# Patient Record
Sex: FEMALE | Race: BLACK OR AFRICAN AMERICAN | NOT HISPANIC OR LATINO
[De-identification: names, ages, dates, MRNs, and addresses within clinical notes are randomized per-mention and may not be internally consistent; named-entity substitution may affect disease eponyms.]

---

## 2021-03-30 ENCOUNTER — APPOINTMENT (OUTPATIENT)
Dept: OTOLARYNGOLOGY | Facility: CLINIC | Age: 76
End: 2021-03-30
Payer: MEDICARE

## 2021-03-30 VITALS
HEIGHT: 68 IN | HEART RATE: 84 BPM | TEMPERATURE: 97.7 F | DIASTOLIC BLOOD PRESSURE: 91 MMHG | WEIGHT: 176 LBS | BODY MASS INDEX: 26.67 KG/M2 | SYSTOLIC BLOOD PRESSURE: 139 MMHG

## 2021-03-30 PROBLEM — Z00.00 ENCOUNTER FOR PREVENTIVE HEALTH EXAMINATION: Status: ACTIVE | Noted: 2021-03-30

## 2021-03-30 PROCEDURE — 31231 NASAL ENDOSCOPY DX: CPT

## 2021-03-30 PROCEDURE — 99204 OFFICE O/P NEW MOD 45 MIN: CPT | Mod: 25

## 2021-03-30 RX ORDER — AMOXICILLIN 500 MG/1
500 TABLET, FILM COATED ORAL
Qty: 14 | Refills: 0 | Status: ACTIVE | COMMUNITY
Start: 2021-03-30 | End: 1900-01-01

## 2021-03-30 RX ORDER — METHYLPREDNISOLONE 4 MG/1
4 TABLET ORAL
Qty: 1 | Refills: 0 | Status: ACTIVE | COMMUNITY
Start: 2021-03-30 | End: 1900-01-01

## 2021-03-30 NOTE — HISTORY OF PRESENT ILLNESS
[de-identified] : Patient complaining of headaches mostly in her frontal area is gone on and off had Covid loss of sense of taste and smell most of it is back but she feels congested runny nose rhinitis seems to be consistent with some underlying allergies she was last treated for sinus infections in fall with an antibiotic nothing since she does not like using any nasal sprays endoscopically she has a severely deviated septum but no evidence of any tumors masses or polyps.

## 2021-03-30 NOTE — REVIEW OF SYSTEMS
[Sneezing] : sneezing [Seasonal Allergies] : seasonal allergies [Post Nasal Drip] : post nasal drip [Nasal Congestion] : nasal congestion [Negative] : Heme/Lymph

## 2021-03-30 NOTE — ASSESSMENT
[FreeTextEntry1] : Patient with symptoms consistent with chronic sinusitis has decreased sense of smell and taste from Covid but is almost back also suffers from allergy symptoms after long discussion and evaluation endoscopically that revealed no evidence of any pus or purulence but did confirm a deviated nasal septum feel she is suffering from underlying chronic sinusitis complicated by allergies.  She will continue her of Allegra I gave her a Medrol Dosepak and a course of amoxicillin and will have her come back and see us in 1 month if the symptoms do not improve by then consideration for a CAT scan will be given.

## 2021-04-27 ENCOUNTER — APPOINTMENT (OUTPATIENT)
Dept: OTOLARYNGOLOGY | Facility: CLINIC | Age: 76
End: 2021-04-27
Payer: MEDICARE

## 2021-04-27 VITALS
SYSTOLIC BLOOD PRESSURE: 132 MMHG | TEMPERATURE: 97.2 F | DIASTOLIC BLOOD PRESSURE: 86 MMHG | WEIGHT: 176 LBS | BODY MASS INDEX: 26.67 KG/M2 | HEIGHT: 68 IN | HEART RATE: 80 BPM

## 2021-04-27 DIAGNOSIS — J30.9 ALLERGIC RHINITIS, UNSPECIFIED: ICD-10-CM

## 2021-04-27 DIAGNOSIS — R09.81 NASAL CONGESTION: ICD-10-CM

## 2021-04-27 PROCEDURE — 31231 NASAL ENDOSCOPY DX: CPT

## 2021-04-27 PROCEDURE — 99214 OFFICE O/P EST MOD 30 MIN: CPT | Mod: 25

## 2021-04-27 RX ORDER — AMOXICILLIN AND CLAVULANATE POTASSIUM 875; 125 MG/1; MG/1
875-125 TABLET, COATED ORAL
Qty: 20 | Refills: 0 | Status: ACTIVE | COMMUNITY
Start: 2021-04-27 | End: 1900-01-01

## 2021-04-27 NOTE — ASSESSMENT
[FreeTextEntry1] : Symptoms improved on steroids and a Z-Laurent but then recurred also seems to have underlying allergy symptoms which Allegra is helping unfortunately she has been taking a lot of sinus medications decongestants which I told her to stop because of her high blood pressure endoscopically no pus or purulence but she still complains of frontal and facial cheek pressure put her on Augmentin and sent her for a CAT scan to definitively evaluate her sinuses based on the results we will determine which direction we go either additional antibiotics or consideration of surgery this was explained to her and all of her questions were answered.\par \par I personally saw and examined [ ] in detail. I have spoken to Clovis JACINTO regarding the assessment and plan of care. I reviewed the above assessment and plan of care, and agree. I have made changes in the body of the note where appropriate.\par

## 2021-04-27 NOTE — PROCEDURE
[FreeTextEntry6] : Nasal Endoscopy (17359)\par \par After informed verbal consent, nasal endoscopy was performed due to anterior rhinoscopy insufficient to account for symptoms. Flexible  scope # 23 was used.  Topical vasoconstrictor and anesthetic was used.  The exam showed a deviated septum to the right \par \par The nasal endoscope is passed via the right nasal cavity. The inferior, middle, and superior turbinates responded to vasoconstrictors. The inferior, middle and superior meati were examined. The osteomeatal complex is clear with no polyposis or purulence. The sphenoethmoidal recess is clear with no polyposis or purulence. The choana is patent. \par \par The nasal endoscope is passed via the left nasal cavity. The inferior, middle, and superior turbinates responded to vasoconstrictors. The inferior, middle and superior meati were examined. The osteomeatal complex is clear with no polyposis or purulence. The sphenoethmoidal recess is clear with no polyposis or purulence. The choana is patent.  \par \par There is []% obstruction of the nasopharynx with adenoid tissue\par

## 2021-04-27 NOTE — HISTORY OF PRESENT ILLNESS
[de-identified] : Patient presents for follow up. States she continues to feel the same. Still has frontal headaches, nasal congestion and runny nose. She took Medrol dose pack and amoxicillin gave her mild relief and then symptoms returned. Takes Allegra and Tylenol with mild relief. No other modifying factors\par \par

## 2022-02-25 ENCOUNTER — EMERGENCY (EMERGENCY)
Facility: HOSPITAL | Age: 77
LOS: 1 days | Discharge: HOME | End: 2022-02-25
Attending: EMERGENCY MEDICINE | Admitting: EMERGENCY MEDICINE
Payer: MEDICARE

## 2022-02-25 VITALS
DIASTOLIC BLOOD PRESSURE: 79 MMHG | HEART RATE: 82 BPM | RESPIRATION RATE: 16 BRPM | TEMPERATURE: 99 F | SYSTOLIC BLOOD PRESSURE: 147 MMHG | OXYGEN SATURATION: 100 %

## 2022-02-25 VITALS
RESPIRATION RATE: 16 BRPM | DIASTOLIC BLOOD PRESSURE: 74 MMHG | HEART RATE: 78 BPM | SYSTOLIC BLOOD PRESSURE: 140 MMHG | OXYGEN SATURATION: 100 %

## 2022-02-25 PROCEDURE — 99284 EMERGENCY DEPT VISIT MOD MDM: CPT

## 2022-02-25 PROCEDURE — 70486 CT MAXILLOFACIAL W/O DYE: CPT | Mod: 26,MG

## 2022-02-25 PROCEDURE — 73100 X-RAY EXAM OF WRIST: CPT | Mod: 26,59,RT

## 2022-02-25 PROCEDURE — 73502 X-RAY EXAM HIP UNI 2-3 VIEWS: CPT | Mod: 26,RT

## 2022-02-25 PROCEDURE — 70450 CT HEAD/BRAIN W/O DYE: CPT | Mod: 26,MG

## 2022-02-25 PROCEDURE — 73110 X-RAY EXAM OF WRIST: CPT | Mod: 26,RT

## 2022-02-25 PROCEDURE — 73562 X-RAY EXAM OF KNEE 3: CPT | Mod: 26,LT

## 2022-02-25 PROCEDURE — 73090 X-RAY EXAM OF FOREARM: CPT | Mod: 26,RT

## 2022-02-25 PROCEDURE — 72170 X-RAY EXAM OF PELVIS: CPT | Mod: 26,59

## 2022-02-25 PROCEDURE — G1004: CPT

## 2022-02-25 RX ORDER — AMOXICILLIN 250 MG/5ML
500 SUSPENSION, RECONSTITUTED, ORAL (ML) ORAL ONCE
Refills: 0 | Status: COMPLETED | OUTPATIENT
Start: 2022-02-25 | End: 2022-02-25

## 2022-02-25 RX ORDER — OXYCODONE AND ACETAMINOPHEN 5; 325 MG/1; MG/1
1 TABLET ORAL ONCE
Refills: 0 | Status: DISCONTINUED | OUTPATIENT
Start: 2022-02-25 | End: 2022-02-25

## 2022-02-25 RX ORDER — AMOXICILLIN 250 MG/5ML
1 SUSPENSION, RECONSTITUTED, ORAL (ML) ORAL
Qty: 21 | Refills: 0
Start: 2022-02-25 | End: 2022-03-03

## 2022-02-25 RX ORDER — OXYCODONE HYDROCHLORIDE 5 MG/1
1 TABLET ORAL
Qty: 12 | Refills: 0
Start: 2022-02-25 | End: 2022-02-27

## 2022-02-25 RX ORDER — TETANUS TOXOID, REDUCED DIPHTHERIA TOXOID AND ACELLULAR PERTUSSIS VACCINE, ADSORBED 5; 2.5; 8; 8; 2.5 [IU]/.5ML; [IU]/.5ML; UG/.5ML; UG/.5ML; UG/.5ML
0.5 SUSPENSION INTRAMUSCULAR ONCE
Refills: 0 | Status: COMPLETED | OUTPATIENT
Start: 2022-02-25 | End: 2022-02-25

## 2022-02-25 RX ORDER — CHLORHEXIDINE GLUCONATE 213 G/1000ML
15 SOLUTION TOPICAL
Qty: 473 | Refills: 0
Start: 2022-02-25

## 2022-02-25 RX ADMIN — TETANUS TOXOID, REDUCED DIPHTHERIA TOXOID AND ACELLULAR PERTUSSIS VACCINE, ADSORBED 0.5 MILLILITER(S): 5; 2.5; 8; 8; 2.5 SUSPENSION INTRAMUSCULAR at 17:03

## 2022-02-25 RX ADMIN — OXYCODONE AND ACETAMINOPHEN 1 TABLET(S): 5; 325 TABLET ORAL at 17:03

## 2022-02-25 RX ADMIN — Medication 500 MILLIGRAM(S): at 22:44

## 2022-02-25 RX ADMIN — OXYCODONE AND ACETAMINOPHEN 1 TABLET(S): 5; 325 TABLET ORAL at 18:03

## 2022-02-25 NOTE — ED ADULT NURSE REASSESSMENT NOTE - NS ED NURSE REASSESS COMMENT FT1
Received report from Day RN: Pt is alert and awake, No NAD or complaints, OMFS in room with patient for sutures. Respirations are even and unlabored, safety precautions implemented as per protocol, will continue to monitor.

## 2022-02-25 NOTE — ED PROVIDER NOTE - ATTENDING CONTRIBUTION TO CARE
Pt had mechanical fall p/w facial laceration to upper lip that is through and through, small laceration to L supraorbital area, L knee pain and R wrist pain. Denies preceding headache, dizziness, cp, sob, palpitations. R Wrist is swollen and painful, able to range it minimally, neurovasc intact throughout R hand, L knee with ecchymosis but full ROM, able to ambulate w/o pain. Head and face CT w/o fracture or ICH, OMFS consulted for complex lac repair of upper lip.

## 2022-02-25 NOTE — ED PROVIDER NOTE - PHYSICAL EXAMINATION
Gen: AAOx3, non-toxic  Head: NCAT  HEENT: EOMI, oral mucosa moist, normal conjunctiva. 1.5cm laceration superior to R eyebrow, 1.5cm lac superior to upper lip not involving vermilion border.   Lung: CTAB, no respiratory distress, no wheezes/rhonchi/rales B/L, speaking in full sentences  CV: RRR, no murmurs, rubs or gallops  Abd: soft, NTND, no guarding, no CVA tenderness  MSK: R wrist ttp; neurovascularly intact. L knee ttp. No c-spine ttp.   Neuro: No focal sensory or motor deficits, normal CN exam   Skin: see above  Psych: normal affect.

## 2022-02-25 NOTE — ED PROVIDER NOTE - OBJECTIVE STATEMENT
76 F with htn, hld not on AC presents s/p trip and fall today with facial lacerations, R wrist pain and left knee pain. Fell 2 hrs prior to arrival with no chest pain, sob, headache, nausea or vision changes. Able to ambulate afterwards. Patient is completely independent. Unknown last Tdap.

## 2022-02-25 NOTE — ED PROVIDER NOTE - PATIENT PORTAL LINK FT
You can access the FollowMyHealth Patient Portal offered by [facility] by registering at the following website: [email or web address]. By joining [facility]’s FollowMyHealth portal, you will also be able to view your health information using other applications (apps) compatible with our system.

## 2022-02-25 NOTE — CONSULT NOTE ADULT - ASSESSMENT
Assessment/Plan: 77 y/o F w PMH of HTN, HLD, hypothyroidism presents to [facility] ED s/p mechanical fall with laceration of right brow and right through and through labial mucosa laceration now s/p repair    - Amox 500 q8h x7 days  - Peridex 15mL swish and spit BID x7d   - Soft, mild diet   - Cover lacerations w bacitracin  - ice to face as needed   - F/U in 1 week in the oral surgery clinic (1st floor [facility]) with Dr Faheem Duckworth. Please call [phone] to schedule appt.  - Please page OMFS resident on call  with questions.

## 2022-02-25 NOTE — ED PROVIDER NOTE - PROGRESS NOTE DETAILS
Laceration repaired by OMFS; will f/u in 1 week. Laceration repaired by OMFS; will f/u in 1 week.  Fracture reduced by ortho; will f/u in 1wk

## 2022-02-25 NOTE — ED ADULT TRIAGE NOTE - CHIEF COMPLAINT QUOTE
Pt states that she tripped and fell in the street, c/o laceration to upper lip and above right eyebrow, right wrist pain.  PMH HTN

## 2022-02-25 NOTE — ED PROVIDER NOTE - CLINICAL SUMMARY MEDICAL DECISION MAKING FREE TEXT BOX
76 F with htn, hld not on AC presents s/p trip and fall today with facial lacerations, R wrist pain and left knee pain. 1.5cm laceration superior to R eyebrow, 1.5cm lac superior to upper lip not involving vermilion border. Fall appears purely mechanical with no signs of cardiac etiology.  R wrist ttp; neurovascularly intact. L knee ttp; Neurovascularly intact. No c-spine ttp. CT head and face, XR, update Tdap, lac repair, reassess.

## 2022-02-25 NOTE — ED PROVIDER NOTE - CARE PROVIDER_API CALL
Wilbur Montalvo)  Orthopedics  [address] [place] [address]  Phone: [phone]  Fax: [phone]  Follow Up Time:

## 2022-02-25 NOTE — ED PROVIDER NOTE - NSFOLLOWUPINSTRUCTIONS_ED_ALL_ED_FT
You were seen in the ED for a fall, found to have a wrist fracture. Also with facial lacerations.   The following labs/imaging were obtained: see attached (if applicable)  Continue home medications (if any).   Take Acetaminophen (Tylenol 1,000mg each 6-8hrs)  Return to the ED if you develop fever,  vomiting, worsening or new concerning symptoms.  Follow up OMFS (oral-maxilar facial surgery),   Discussed with pt results of work up, strict return precautions, and need for follow up.  Pt expressed understanding and agrees with plan. You were seen in the ED for a fall, found to have a wrist fracture. Also with facial lacerations.   The following labs/imaging were obtained: see attached (if applicable)  Continue home medications (if any).   Take Acetaminophen (Tylenol 1,000mg each 6-8hrs)  Return to the ED if you develop fever,  vomiting, worsening or new concerning symptoms.  Follow up OMFS Dr. Jenise Araujo (oral-maxila facial surgery) in 1 week for suture removal. Call [phone]  Follow up with orthopedics in 1-2 weeks. See attached.   Discussed with pt results of work up, strict return precautions, and need for follow up.  Pt expressed understanding and agrees with plan. You were seen in the ED for a fall, found to have a wrist fracture. Also with facial lacerations.   The following labs/imaging were obtained: see attached (if applicable)  Continue home medications (if any).   Take Acetaminophen (Tylenol 1,000mg each 6-8hrs)  Take antibiotic as prescribed (amoxicillin).   Return to the ED if you develop fever,  vomiting, worsening or new concerning symptoms.  Follow up OMFS Dr. Jenise Araujo (oral-maxila facial surgery) in 1 week for suture removal. Call [phone]  Follow up with orthopedics in 1-2 weeks. See attached.   Discussed with pt results of work up, strict return precautions, and need for follow up.  Pt expressed understanding and agrees with plan.

## 2022-02-25 NOTE — ED ADULT NURSE NOTE - OBJECTIVE STATEMENT
Break Coverage RN: Received pt in room 5, pt A&Ox4, respirations even and unlabored b/l. Pt s/p trip and fall outside on street. Denies LOC, denies n/v, blurry vision. Pt with laceration above lip and R. eyebrow. Also c/o R. wrist pain. Appears in no apparent distress. Medicated as per MD order. Will continue to monitor.

## 2022-02-26 NOTE — CONSULT NOTE ADULT - SUBJECTIVE AND OBJECTIVE BOX
HPI: 75 y/o F w PMH of HTN, HLD, hypothyroidism presents to [facility] ED s/p mechanical fall earlier today. Pt fell and hit front of face on ground. Denies LoC. Also sustained right wrist fracture that ortho was paged for. Denies chest pain, sob, headache, nausea or vision changes. Able to ambulate afterwards. Patient is completely independent. Unknown last Tdap      Pt seen and examined at bedside.     ADDITIONAL REVIEW OF SYSTEMS:  Vital Signs Last 24 Hrs  T(C): 37.1 (25 Feb 2022 15:41), Max: 37.1 (25 Feb 2022 15:41)  T(F): 98.8 (25 Feb 2022 15:41), Max: 98.8 (25 Feb 2022 15:41)  HR: 78 (25 Feb 2022 17:07) (78 - 82)  BP: 140/74 (25 Feb 2022 17:07) (140/74 - 147/79)  BP(mean): --  RR: 16 (25 Feb 2022 17:07) (16 - 16)  SpO2: 100% (25 Feb 2022 17:07) (100% - 100%)    MEDICATIONS  (STANDING):  amoxicillin 500 milliGRAM(s) Oral Once    MEDICATIONS  (PRN):      CAPILLARY BLOOD GLUCOSE        I&O's Summary      PHYSICAL EXAM:  Gen: AAOx3. NAD.    Resp: Unlabored on RA.   Neuro: bilateral CN V2-V3, bilateral  CN VII grossly intact.  HEENT:    Head: no facial swelling, no erythema associated with ***and consistent with the injury, no ecchymosis. No bony step-off palpated, 1cm linear laceration to upper right eye brow, hemostatic, 2cm linear complex laceration to skin over right lip.  Eye: EOM intact. PERRLA, no  subconjunctival hemorrhage. no  periorbital ecchymosis/edema. No changes to vision, diplopia, exophthalmos, enophthalmos.    Ears: No blood visualized in the EAC. No changes in hearing.   Nose:  no nasal dorsum deviation. no septal hematoma.   Intraoral: KHLOE 50mm. . Occlusion stable and reproducible, 2 cm linear labial mucosal laceration that communicates with extraoral laceration. incisal edge of tooth #8 fractured. Teeth are non-mobile. no sign of mandibular fracture       RADIOLOGY & ADDITIONAL TESTS:  CT:  HEAD:    There is no CT evidence of acute intracranial hemorrhage, extra-axial   collection, vasogenic edema, mass effect, midline shift, central   herniation, or hydrocephalus.    Bilateral basal ganglia calcifications.    There is mild cerebral volume loss consistent with age-related   involutional changes. There is mild patchy white matter hypoattenuation   which is nonspecific in etiology but likely related to chronic   microvascular-type changes.    The soft tissues of the scalp are unremarkable. The calvarium is intact.   Left lens replacement.    MAXILLOFACIAL:    Right periorbital soft tissue swelling. No acute facial bone fractures   are visualized.    The temporomandibular joints are intact. No septal hematoma is seen.    The globes are symmetric in size and contour. Extraocular muscles are not   enlarged or deviated. No radiopaque orbital foreign bodies are   visualized. The retrobulbar fat is preserved.    Mild thickening of the right sphenoid sinus. Remainder of visualized   paranasal sinuses are clear. The mastoid air cells and middle ear   cavities are clear.    IMPRESSION:    Head CT: No evidence for intracranial hemorrhage, mass effect, or   displaced calvarial fracture. Chronic age-related and microvascular-type   changes.    Maxillofacial CT: No acute maxillofacial bone fracture.           Treatment rendered -- Laceration repair:   Diagnosis, indications for procedure and R/B/A discussed with pt. Verbal consent obtained.  4mL 2% lidocaine w/ 1:100K epi given via right labial infiltration and right brow infiltration. Copious sterile saline irrigation used. lip repair: 3x 3-0 chromic gut used to reapproximate intraoral laceration. 3x 4-0 Vicryl sutures used to reapproximate deep tissues. 4x 5-0 Prolene sutures used to reapproximate skin. Wound covered with bacitracin and dressed with steristrips. Brow repair: 3x 5-0 Prolene sutures used to reapproximate skin. Pt tolerated procedure well. POIG.         
Orthopedic Surgery Consult Note    76yFemale RHD presents s/p mechanical slip and fall on ice onto right arm. Reports pain and difficulty moving affected extremity afterward. +headstrike, no LOC. lacerations R eyebrow and upper lip. Denies numbness/tingling of the affected extremity. No other bone or joint complaints.      PAST MEDICAL & SURGICAL HISTORY:  No pertinent past medical history      Allergies    No Known Allergies    Intolerances          PE  Gen: NAD  RUE:  minimal swelling about wrist, skin intact, no ecchymosis  Decreased ROM of wrist 2/2 pain, +ttp about wrist and dorsal carpus, no ttp about elbow  AIN/PIN/U motor Intact  SILT M/U/R  Compartments soft/compressible  Radial pulse palpable, WWP distally    Secondary: small lacerations R eyebrow and upper lip. TTP R patella, able to straight leg raise. Otherwise no TTP over bony prominences. SILT b/l, ROM intact b/l. Distal pulses palpable.     Imaging:  XR showing right radial styloid fracture, possible dorsal lunate avulsion fracture    Procedure Note:  Patient placed in well-padded, molded short arm cast.  Patient tolerated the procedure well. Post cast x-rays reviewed in maintained alignment. Patient neurovascularly intact after procedure.     A/P: 76yFemale s/p casting of right radial styloid and ?lunate fracture  - Pain control  - Strict Ice/Elevation  - NWB on affected extremity in cast  - Sling for comfort  - Keep cast clean/dry/intact until follow up  - Encourage active finger motion  - Follow up with Dr. Montalvo in one week. Call [phone] for appointment    Discussed with attending orthopaedic trauma surgeon on call, Dr. Katia Ellis PGY-2  Orthopaedic Surgery  [facility] [id number]  [facility] [phone]  [facility] [id number]

## 2022-03-01 PROBLEM — Z78.9 OTHER SPECIFIED HEALTH STATUS: Chronic | Status: ACTIVE | Noted: 2022-02-25

## 2022-03-03 ENCOUNTER — APPOINTMENT (OUTPATIENT)
Dept: ORTHOPEDIC SURGERY | Facility: CLINIC | Age: 77
End: 2022-03-03
Payer: MEDICARE

## 2022-03-03 VITALS — BODY MASS INDEX: 26.52 KG/M2 | HEIGHT: 68 IN | WEIGHT: 175 LBS

## 2022-03-03 PROCEDURE — 73110 X-RAY EXAM OF WRIST: CPT | Mod: RT

## 2022-03-03 PROCEDURE — 25600 CLTX DST RDL FX/EPHYS SEP WO: CPT | Mod: RT

## 2022-03-03 PROCEDURE — 99204 OFFICE O/P NEW MOD 45 MIN: CPT | Mod: 57

## 2022-03-03 NOTE — PHYSICAL EXAM
[de-identified] : The patient is sitting comfortably in the exam room. \par Right upper extremity\par -Short arm cast in place\par -Minimal swelling of fingers, patient able to make a fist\par -Pronation 45, supination 45\par -Sensation is intact median, radial, ulnar, axillary nerves\par -Motor is intact median, radial, ulnar, axillary nerves\par -Hand is warm and well-perfused, Palpable radial and ulnar pulses [de-identified] : X-rays of the right wrist were taken in the office today including AP oblique and lateral.  X-rays show great alignment of the radial styloid fracture.  There is maintenance of radial inclination and radial height.  There is slight widening of the scapholunate joint.

## 2022-03-03 NOTE — HISTORY OF PRESENT ILLNESS
[de-identified] : Ms. YVON OSBORN is a 76 year RHD woman presents today for right wrist. She states she was walking in the street on 2/25/22 when she tripped and fell.  She was seen at [facility] and diagnosed with a wrist fracture.  She was put in a short arm cast.  She denies any numbness or tingling in her fingers.  She has been tolerating the short arm cast.\par

## 2022-03-03 NOTE — DISCUSSION/SUMMARY
[de-identified] : -The risks and benefits of operative versus nonoperative management were discussed at length with the patient. The patient shows a good understanding of the injury and treatment options. They would like to move forward with nonoperative management of the intra-articular radial styloid fracture\par -Continue short arm cast, cast care was reviewed\par -Range of motion exercises fingers.  A handout was given and exercises were demonstrated in the office.\par -Elevation for swelling\par -Patient inquired about driving and I let her know that whether or not she drives is her decision.\par -Follow-up in 1 month with x-rays of the right wrist at that time.  We will likely remove the cast at that time and then we will start physical therapy.\par -All of their questions and concerns were addressed.\par

## 2022-03-31 ENCOUNTER — APPOINTMENT (OUTPATIENT)
Dept: ORTHOPEDIC SURGERY | Facility: CLINIC | Age: 77
End: 2022-03-31
Payer: MEDICARE

## 2022-03-31 PROCEDURE — 73110 X-RAY EXAM OF WRIST: CPT | Mod: RT

## 2022-03-31 PROCEDURE — 99024 POSTOP FOLLOW-UP VISIT: CPT

## 2022-03-31 NOTE — DISCUSSION/SUMMARY
[de-identified] : 76-year-old woman approximately 5 weeks out from nonoperative management of right radial styloid fracture, doing great\par -Removable splint\par -Occupational therapy for range of motion\par -Range of motion exercises were demonstrated in the office.  A long discussion was held with the patient regarding the importance of doing home exercises and regaining range of motion of the fingers.\par -Elevation for swelling\par -Again the patient inquired about driving and I let her know that whether or not she drives is her decision.\par -Follow-up in 6 weeks with x-rays of the right wrist at that time. \par -All of her questions and concerns were addressed.\par

## 2022-03-31 NOTE — PHYSICAL EXAM
[de-identified] : The patient is sitting comfortably in the exam room. \par Right upper extremity\par -Short arm cast removed\par -Minimal swelling of fingers, patient almost able to make a fist, she has some stiffness at the MCPs\par -Pronation 90, supination 90\par -Sensation is intact median, radial, ulnar, axillary nerves\par -Motor is intact median, radial, ulnar, axillary nerves\par -Hand is warm and well-perfused, Palpable radial and ulnar pulses [de-identified] : X-rays of the right wrist were taken in the office today including AP oblique and lateral.  X-rays show great alignment of the radial styloid fracture.  There is maintenance of radial inclination and radial height.  There is no significant displacement of the distal radius fracture.

## 2022-03-31 NOTE — HISTORY OF PRESENT ILLNESS
[de-identified] : Ms. YVON OSBORN is a 76 year RHD woman 5 weeks out from closed distal radius fracture treated in a short arm cast.  She tolerated her cast well.  She denies any numbness or tingling in the hand and has minimal pain.\par

## 2022-05-18 ENCOUNTER — APPOINTMENT (OUTPATIENT)
Dept: ORTHOPEDIC SURGERY | Facility: CLINIC | Age: 77
End: 2022-05-18
Payer: MEDICARE

## 2022-05-18 PROCEDURE — 99024 POSTOP FOLLOW-UP VISIT: CPT

## 2022-05-18 PROCEDURE — 73110 X-RAY EXAM OF WRIST: CPT | Mod: RT

## 2022-05-18 NOTE — DISCUSSION/SUMMARY
[de-identified] : 76-year-old woman approximately 3 months out from nonoperative management of right radial styloid fracture, doing great\par -Continue occupational therapy for range of motion and strengthening\par -No need for pain medication\par -Follow-up in 3 months with x-rays of the right wrist at that time. \par -All of her questions and concerns were addressed.\par

## 2022-05-18 NOTE — PHYSICAL EXAM
[de-identified] : The patient is sitting comfortably in the exam room. \par Right upper extremity\par -Skin is intact, no swelling\par -No tenderness to palpation over the distal radius\par -Patient is able to make a complete fist\par -Pronation 90, supination 90\par -Sensation is intact median, radial, ulnar, axillary nerves\par -Motor is intact median, radial, ulnar, axillary nerves\par -Hand is warm and well-perfused, Palpable radial and ulnar pulses [de-identified] : X-rays of the right wrist were taken in the office today including AP oblique and lateral.  X-rays show great alignment of the radial styloid fracture.  There is maintenance of radial inclination and radial height.  There is no significant displacement of the distal radius fracture.  There is interval healing at the fracture site.

## 2022-05-18 NOTE — HISTORY OF PRESENT ILLNESS
[de-identified] : Ms. YVON OSBORN is a 76 year RHD woman approximately 3 months out from closed distal radius fracture (2/25/22) treated in a short arm cast.  Since her last visit she is doing well.  She continues with occupational therapy and continues to get stronger.  She reports she does not have any pain.  She does not have any numbness or tingling\par

## 2022-06-27 ENCOUNTER — TRANSCRIPTION ENCOUNTER (OUTPATIENT)
Age: 77
End: 2022-06-27

## 2022-06-28 ENCOUNTER — APPOINTMENT (OUTPATIENT)
Dept: DISASTER EMERGENCY | Facility: HOSPITAL | Age: 77
End: 2022-06-28

## 2022-06-28 ENCOUNTER — OUTPATIENT (OUTPATIENT)
Dept: OUTPATIENT SERVICES | Facility: HOSPITAL | Age: 77
LOS: 1 days | End: 2022-06-28

## 2022-06-28 VITALS
OXYGEN SATURATION: 98 % | DIASTOLIC BLOOD PRESSURE: 83 MMHG | RESPIRATION RATE: 18 BRPM | HEART RATE: 88 BPM | TEMPERATURE: 99 F | SYSTOLIC BLOOD PRESSURE: 133 MMHG

## 2022-06-28 VITALS
HEIGHT: 68 IN | TEMPERATURE: 98 F | WEIGHT: 186.07 LBS | HEART RATE: 100 BPM | DIASTOLIC BLOOD PRESSURE: 97 MMHG | RESPIRATION RATE: 18 BRPM | OXYGEN SATURATION: 98 % | SYSTOLIC BLOOD PRESSURE: 143 MMHG

## 2022-06-28 DIAGNOSIS — U07.1 COVID-19: ICD-10-CM

## 2022-06-28 RX ORDER — BEBTELOVIMAB 87.5 MG/ML
175 INJECTION, SOLUTION INTRAVENOUS ONCE
Refills: 0 | Status: COMPLETED | OUTPATIENT
Start: 2022-06-28 | End: 2022-06-28

## 2022-06-28 RX ADMIN — BEBTELOVIMAB 175 MILLIGRAM(S): 87.5 INJECTION, SOLUTION INTRAVENOUS at 14:57

## 2022-06-28 NOTE — MONOCLONAL ANTIBODY INFUSION - HOME MEDICATIONS
Oxaydo 5 mg oral tablet , 1 tab(s) orally every 6 hours MDD:4 tablets  Peridex 0.12% mucous membrane liquid , 15 milliliter(s) orally 2 times a day   Swish & Spit   amoxicillin 500 mg oral capsule , 1 cap(s) orally every 8 hours

## 2022-06-28 NOTE — MONOCLONAL ANTIBODY INFUSION - ASSESSMENT AND PLAN
75 y/o M/F with PMHx of HTN and recently diagnosed with Covid-19 infection who was referred for monoclonal antibody infusion after testing positive for COVID 19 on 6/23/22.  Patient states he has been experiencing cough & congestion.        PLAN:  - Injection procedure explained to patient   - Consent for monoclonal antibody injection obtained   - Risk & benefits discussed/all questions answered  - Inject Bebtelovimab 175 mg over 1 minutes   - Observe patient for one hour post administration    I have reviewed the Bebtelovimab Emergency Use Authorization (EUA) and I have provided the patient or patient's caregiver with the following information:    1. FDA has authorized emergency use Bebtelovimab, which is not an FDA-approved biological product.  2. The patient or patient's caregiver has the option to accept or refuse administration of Bebtelovimab.  3. The significant known and potential risks and benefits of Bebtelovimab and the extent to which such risks and benefits are unknown.  4. Information on available alternative treatments and risks and benefits of those alternatives.    The patient's COVID monoclonal antibody injection administration went well without any complications. The patient tolerated the treatment without any reactions. Vitals were stable throughout the injection & post-injection administration. The pt denies any CP, fevers, chills, SOB, numbness/tingling in b/l limbs, loss of sensation or motor function, N/V/D while receiving the injection. Patient denies any symptoms an hour after post injection. Vitals were taken post injection and were stable. Pt is medically stable to be discharged home. Discharge instructions were provided to the patient with a fact sheet included. Patient was instructed to self-isolate and use infection control measures (e.g wear mask, isolate, social distance, avoid sharing personal items, clean and disinfect "high touch" surfaces, and frequent handwashing according to the CDC guidelines. The patient was informed on what symptoms to be aware of for the next couple of days, and if there are any issues to call the 24/7 clinical call center. Patient was instructed to follow up with PCP as needed.

## 2022-08-17 ENCOUNTER — APPOINTMENT (OUTPATIENT)
Dept: ORTHOPEDIC SURGERY | Facility: CLINIC | Age: 77
End: 2022-08-17

## 2022-08-17 VITALS — BODY MASS INDEX: 28.19 KG/M2 | WEIGHT: 186 LBS | HEIGHT: 68 IN

## 2022-08-17 DIAGNOSIS — S52.511A DISPLACED FRACTURE OF RIGHT RADIAL STYLOID PROCESS, INITIAL ENCOUNTER FOR CLOSED FRACTURE: ICD-10-CM

## 2022-08-17 PROCEDURE — 99214 OFFICE O/P EST MOD 30 MIN: CPT

## 2022-08-17 PROCEDURE — 73110 X-RAY EXAM OF WRIST: CPT | Mod: RT

## 2022-08-17 NOTE — PHYSICAL EXAM
[de-identified] : The patient is sitting comfortably in the exam room. \par Right upper extremity\par -Skin is intact, no swelling\par -No tenderness to palpation over the distal radius\par -Patient is able to make a complete fist\par -Pronation 90, supination 90\par -Sensation is intact median, radial, ulnar, axillary nerves\par -Motor is intact median, radial, ulnar, axillary nerves\par -Hand is warm and well-perfused, Palpable radial and ulnar pulses [de-identified] : Xrays of the right wrist were taken in the office today, 8/17/22.  AP oblique and lateral.  X-rays show good overall alignment of the carpus with interval healing and remodeling of the radial styloid fracture.

## 2022-08-17 NOTE — DISCUSSION/SUMMARY
[de-identified] : 76-year-old woman with a closed distal radius fracture treated nonoperatively, approximately 5.5 months out, doing great\par -Tylenol and NSAIDs for occasional pain\par -Home exercises to decrease stiffness were provided. These were demonstrated in the office today.\par -Follow-up prn with x-rays at that time\par -All the patient's questions and concerns were addressed during this visit\par

## 2022-08-17 NOTE — HISTORY OF PRESENT ILLNESS
[de-identified] : Patient is a 76 year-old, right-hand-dominant female who presents to the office today for re-evaluation of closed distal radius fracture (02/25/2022) treated in a short arm cast.  Since her last visit she is doing well. She continues with occupational therapy and continues to get stronger.  She reports she does not have any pain but stiffness at certain points.  She denies paresthesias. \par

## 2022-10-07 ENCOUNTER — APPOINTMENT (OUTPATIENT)
Dept: OTOLARYNGOLOGY | Facility: CLINIC | Age: 77
End: 2022-10-07

## 2022-10-07 VITALS
SYSTOLIC BLOOD PRESSURE: 128 MMHG | BODY MASS INDEX: 28.19 KG/M2 | DIASTOLIC BLOOD PRESSURE: 77 MMHG | HEART RATE: 87 BPM | TEMPERATURE: 97.2 F | HEIGHT: 68 IN | WEIGHT: 186 LBS

## 2022-10-07 DIAGNOSIS — J34.2 DEVIATED NASAL SEPTUM: ICD-10-CM

## 2022-10-07 DIAGNOSIS — L29.9 PRURITUS, UNSPECIFIED: ICD-10-CM

## 2022-10-07 DIAGNOSIS — J32.1 CHRONIC FRONTAL SINUSITIS: ICD-10-CM

## 2022-10-07 PROCEDURE — 31231 NASAL ENDOSCOPY DX: CPT

## 2022-10-07 PROCEDURE — 99214 OFFICE O/P EST MOD 30 MIN: CPT | Mod: 25

## 2022-10-07 RX ORDER — METHYLPREDNISOLONE 4 MG/1
4 TABLET ORAL
Qty: 1 | Refills: 0 | Status: ACTIVE | COMMUNITY
Start: 2022-10-07 | End: 1900-01-01

## 2022-10-07 NOTE — HISTORY OF PRESENT ILLNESS
[de-identified] : Patient is here today with continued nasal congestion and sinus pressure since last year. She was last treated with an antibiotic about a year ago. She still has pressure in the cheeks that comes and goes. She has not been taking any nasal sprays. \par For the last week or so she had itching on the neck and the face on the outside skin area. She tried to moisturize the area, never saw a rash. Then, she developed these small pockets above her eyelids and she skin started to flake. \par

## 2022-10-07 NOTE — REVIEW OF SYSTEMS
[Seasonal Allergies] : seasonal allergies [Nasal Congestion] : nasal congestion [Recurrent Sinus Infections] : recurrent sinus infections [Sinus Pain] : sinus pain [Sinus Pressure] : sinus pressure [Itching] : itching [Negative] : Heme/Lymph

## 2022-10-07 NOTE — ASSESSMENT
[FreeTextEntry1] : Patient 77-year-old female has underlying allergies been complaining recently of itchiness of her skin of her neck around her eyes seems to be more dermatitis she is going to see her dermatologist.  Concern of some puffiness of the eyes which seems to be the beginning of some fat herniation to the majority.  Nasal endoscopy was essentially normal no evidence of any tumors or masses or polyps put on a Medrol Dosepak she is taking Benadryl at night and encouraged her to take Allegra during the day since the Benadryl make her drowsy.

## 2022-10-07 NOTE — END OF VISIT
[FreeTextEntry3] : I, Dr. Brown personally performed the evaluation and management (E/M) services , including all procedures, for this established patient who presents today with (a) new problem(s)/exacerbation of (an) existing condition(s). That E/M includes conducting the clinically appropriate interval history &/or exam, assessing all new/exacerbated conditions, and establishing a new plan of care. Today, my EDDA, Aileen Amador, was here to observe &/or participate in the visit & follow plan of care established by me.\par \par \par

## 2022-11-16 ENCOUNTER — APPOINTMENT (OUTPATIENT)
Dept: ORTHOPEDIC SURGERY | Facility: CLINIC | Age: 77
End: 2022-11-16

## 2022-11-16 VITALS
BODY MASS INDEX: 28.19 KG/M2 | HEART RATE: 89 BPM | SYSTOLIC BLOOD PRESSURE: 148 MMHG | TEMPERATURE: 97.8 F | WEIGHT: 186 LBS | HEIGHT: 68 IN | OXYGEN SATURATION: 97 % | DIASTOLIC BLOOD PRESSURE: 83 MMHG

## 2022-11-16 PROCEDURE — 73565 X-RAY EXAM OF KNEES: CPT

## 2022-11-16 PROCEDURE — 73030 X-RAY EXAM OF SHOULDER: CPT | Mod: LT

## 2022-11-16 PROCEDURE — 99213 OFFICE O/P EST LOW 20 MIN: CPT

## 2022-11-16 NOTE — HISTORY OF PRESENT ILLNESS
[de-identified] : YVON Minor is a 76 y.o. RHD woman who presents to the office today for evaluation of right knee pain x 2 weeks. No injury reported. The pain is brought on with excessive walking during vacation. She is taking Tylenol for pain and applies heat which is helpful. She was on vacation and pain got worse after taking 12 hour flight back home.  The pain is not in her calf it is isolated to the posterior aspect of the knee.  Denies swelling, numbness or tingling. She also sustained a fall in bathroom while away fell forward on her left arm.  She reports her left shoulder is generally sore.

## 2022-11-16 NOTE — PHYSICAL EXAM
[de-identified] : Ms. YVON OSBORN is sitting comfortably in the exam room \par Left shoulder\par -Skin is intact, no swelling, no ecchymosis\par -FE: 160, ER: 40, IR: L5, ABD: 90\par -No drop arm signs\par -Good strength with resisted elevation, external rotation, internal rotation, abduction\par -Able to make a fist\par -Sensation is intact median, radial, ulnar, axillary nerves\par -Motor is intact median, radial, ulnar, axillary nerves\par -Hand is warm and well-perfused, Palpable radial and ulnar pulses\par \par The patient is sitting comfortably in the exam room. \par Right knee\par -Skin is intact, no swelling, no ecchymosis\par -Range of motion knee 0-130\par -Negative Lachman, negative anterior drawer, negative posterior drawer\par -Equivocal negative Rasta\par -Stable to varus and valgus stress\par -Sensation is intact L1-S1\par -5/5 EHL, FHL, TA, GS, quadriceps, hamstrings\par -Foot is warm and well-perfused, palpable dorsalis pedis pulse [de-identified] : X-rays of the left shoulder were taken in the office today including AP internal rotation and axillary views.  X-rays show good overall alignment of the shoulder.  The glenohumeral joint is reduced.  There are no fractures.  The clavicle is slightly superior relative to the acromion.\par \par X-rays of bilateral knees were done in the office today.  The knee x-rays were weightbearing.  X-rays show tricompartmental arthritis.  There is significant arthritis in the patellofemoral joints.  The right is worse than the left.

## 2022-11-16 NOTE — DISCUSSION/SUMMARY
[de-identified] : 76-year-old woman with likely right Bakers cyst and left shoulder strain, approximately 2 weeks out.\par -Long discussion was held with the patient regarding the x-ray findings and physical exam findings.  I explained that she likely has a Baker's cyst in the posterior aspect of the right knee.  That would explain the isolated posterior knee discomfort with posterior knee swelling.  She also has a strain of the left shoulder.  Is generally sore.\par -Weightbearing as tolerated bilateral upper and lower extremities\par -Physical therapy: begin to improve ROM and strengthen left shoulder and right knee\par -Meloxicam for pain\par -Follow-up prn with x-rays at that time\par -All the patient's questions and concerns were addressed during this visit\par

## 2023-02-02 ENCOUNTER — APPOINTMENT (OUTPATIENT)
Dept: ORTHOPEDIC SURGERY | Facility: CLINIC | Age: 78
End: 2023-02-02
Payer: MEDICARE

## 2023-02-02 ENCOUNTER — APPOINTMENT (OUTPATIENT)
Dept: MRI IMAGING | Facility: CLINIC | Age: 78
End: 2023-02-02
Payer: MEDICARE

## 2023-02-02 ENCOUNTER — OUTPATIENT (OUTPATIENT)
Dept: OUTPATIENT SERVICES | Facility: HOSPITAL | Age: 78
LOS: 1 days | End: 2023-02-02
Payer: MEDICARE

## 2023-02-02 VITALS — HEIGHT: 68 IN | WEIGHT: 182 LBS | BODY MASS INDEX: 27.58 KG/M2

## 2023-02-02 DIAGNOSIS — S83.8X1A SPRAIN OF OTHER SPECIFIED PARTS OF RIGHT KNEE, INITIAL ENCOUNTER: ICD-10-CM

## 2023-02-02 PROCEDURE — 99213 OFFICE O/P EST LOW 20 MIN: CPT

## 2023-02-02 PROCEDURE — 73565 X-RAY EXAM OF KNEES: CPT

## 2023-02-02 PROCEDURE — 73721 MRI JNT OF LWR EXTRE W/O DYE: CPT | Mod: 26,RT,MH

## 2023-02-02 PROCEDURE — 73721 MRI JNT OF LWR EXTRE W/O DYE: CPT

## 2023-02-02 PROCEDURE — 73030 X-RAY EXAM OF SHOULDER: CPT | Mod: LT

## 2023-02-02 NOTE — PHYSICAL EXAM
[de-identified] : The patient is sitting comfortably in the exam room. \par Right knee\par -Skin is intact, no swelling, no ecchymosis\par -Slightly full in the posterior aspect of the knee compared to the contralateral side\par -Range of motion knee 0-120 with discomfort at full extension and flexion\par -Negative Lachman, negative anterior drawer, negative posterior drawer\par -Equivocal Rasta\par -Stable to varus and valgus stress\par -Sensation is intact L1-S1\par -5/5 EHL, FHL, TA, GS, quadriceps, hamstrings\par -Foot is warm and well-perfused, palpable dorsalis pedis pulse [de-identified] : X-rays of the left shoulder were taken in the office today including AP internal rotation and axillary views.  X-rays show good overall alignment of the shoulder.  The glenohumeral joint is reduced.  There are no fractures.  The clavicle is slightly superior relative to the acromion.\par \par X-rays of bilateral knees were done in the office today.  The knee x-rays were weightbearing.  X-rays show tricompartmental arthritis.  There is significant arthritis in the patellofemoral joints.  The right is worse than the left.

## 2023-02-02 NOTE — HISTORY OF PRESENT ILLNESS
[de-identified] : YVON Minor is a 76 y.o. RHD woman who presents to the office today for evaluation of right knee pain x 2 weeks. Since her last visit she states she is feeling better but disappointed and that her pain will come and go. She has been participating in PT 2x week. About 2 weeks ago she noticed some swelling and stiffness and a feeling of "fullness". She has continued PT and feels that it has gotten slightly better. She feels pain in the posterior aspect of her knee with flexion and extension.  Her shoulder pain has resolved.

## 2023-02-02 NOTE — DISCUSSION/SUMMARY
[de-identified] : 77-year-old woman with likely right meniscal tear and Bakers cyst that causes periodic pain and discomfort, approximately 3 months out,\par -Physical exam and x-ray findings were discussed with the patient\par -Weightbearing as tolerated bilateral upper and lower extremities\par -Continue Physical therapy: begin to improve ROM and strengthen right knee\par -Meloxicam for pain\par -MRI to assess meniscus and possible Baker's cyst in right knee\par -Follow-up after MRI to discuss further treatment options and potential cortisone injection\par -The patient is going on a cruise later this month and potentially would like an injection in the knee prior to the cruise.\par -All the patient's questions and concerns were addressed during this visit\par

## 2023-02-08 ENCOUNTER — APPOINTMENT (OUTPATIENT)
Dept: ORTHOPEDIC SURGERY | Facility: CLINIC | Age: 78
End: 2023-02-08
Payer: MEDICARE

## 2023-02-08 PROCEDURE — 99213 OFFICE O/P EST LOW 20 MIN: CPT | Mod: 25

## 2023-02-08 PROCEDURE — 20610 DRAIN/INJ JOINT/BURSA W/O US: CPT | Mod: RT

## 2023-02-08 NOTE — PHYSICAL EXAM
[de-identified] : The patient is sitting comfortably in the exam room. \par Right knee\par -Skin is intact, no swelling, no ecchymosis\par -Slightly full in the posterior aspect of the knee compared to the contralateral side\par -Range of motion knee 0-130 with discomfort at full extension and flexion\par -Negative Lachman, negative anterior drawer, negative posterior drawer\par -Equivocal Rasta\par -Stable to varus and valgus stress\par -Sensation is intact L1-S1\par -5/5 EHL, FHL, TA, GS, quadriceps, hamstrings\par -Foot is warm and well-perfused, palpable dorsalis pedis pulse [de-identified] : EXAM: [id number] - MR KNEE RT - ORDERED BY: MARIA EUGENIA RAY\par \par \par PROCEDURE DATE: 02/02/2023\par \par \par \par INTERPRETATION: MR KNEE RIGHT\par \par HISTORY: Right knee pain. Evaluate for meniscal tear. Pain for 2 weeks. Swelling and posterior knee pain. No trauma.\par \par TECHNIQUE: Multiplanar MRI of the right knee was performed without contrast.\par \par COMPARISON: None.\par \par FINDINGS:\par \par OSSEOUS STRUCTURES\par  Fractures/Contusions: None.\par \par LIGAMENTS AND TENDONS\par  Anterior Cruciate Ligament: Intact with slight intrasubstance ganglion formation suggested within the footprint.\par  Posterior Cruciate Ligament: Intact.\par  Distal Quadriceps Tendon: Intact. Suprapatellar enthesophyte is noted.\par  Patellar Tendon: Intact.\par  Patellar Retinaculum: Intact.\par  Medial Collateral Ligament: There is are intact with adjacent soft tissue edema presumably related to underlying meniscal pathology in the absence of recent injury.\par  Posterolateral Corner Structures: Intact.\par  Iliotibial Band: Unremarkable.\par \par MEDIAL COMPARTMENT\par  Medial Meniscus: Undersurface radial tearing involves the posterior horn with horizontal undersurface tearing extending into the mid body. Body is partially extruded. Moderate intrasubstance degeneration involves the anterior horn.\par  Articular Cartilage: There is moderate cartilage thinning and irregularity scattered along the weightbearing medial femoral condyle with faint subchondral reactive edema. Small-to-moderate sized osteophytes are also present.\par \par LATERAL COMPARTMENT\par  Lateral Meniscus: Mild intrasubstance degeneration is present without articular surface tearing.\par  Articular Cartilage: A focus of 50% thickness cartilage loss is seen along the central to posterior lateral tibial plateau. Small marginal osteophytes are present.\par \par PATELLOFEMORAL COMPARTMENT\par  Articular Cartilage: There is severe generalized cartilage loss of the patella with subchondral reactive changes and mild flattening of the lateral patellar facet. Severe cartilage loss is present along the adjacent lateral trochlea with subchondral reactive changes. Several fissures are noted along the central trochlea extending towards full-thickness. Small marginal osteophytes are noted.\par  Patellar Alignment: Maintained.\par \par JOINT CAPSULE\par  Effusion/Synovitis: A moderate-sized joint effusion is present with mild scattered synovial thickening.\par  Intra-articular Bodies: None.\par \par PERIARTICULAR SOFT TISSUES\par  Periarticular Cysts: Tiny Baker's cyst is noted.\par  Musculature: Preserved.\par  Neurovascular Structures: Preserved.\par  Subcutaneous Tissues: Mild-to-moderate scattered subcutaneous edema is present with prepatellar predominance.\par \par IMPRESSION:\par 1. Undersurface radial tearing involves the medial meniscus posterior horn with horizontal undersurface tearing extending into the mid body.\par 2. 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 Feb 3 2023 12:51PM

## 2023-02-08 NOTE — HISTORY OF PRESENT ILLNESS
[de-identified] : YVON Minor is a 77 y.o. RHD woman who presents to the office today for follow up of right knee pain. Since her last visit she states she is feeling about the same from the previous visit.  Patient has been going to physical therapy.  She reports that this has been helping.  The pain is still present in the right knee.\par She had an MRI and is here to discuss results and further treatment options.

## 2023-02-08 NOTE — PROCEDURE
[Injection] : Injection [Right] : of the right [Knee Joint] : knee joint [Osteoarthritis] : Osteoarthritis [Patient] : patient [Risk] : risk [Benefits] : benefits [Alternatives] : alternatives [Bleeding] : bleeding [Infection] : infection [Allergic Reaction] : allergic reaction [Verbal Consent Obtained] : verbal consent was obtained prior to the procedure [Lateral] : lateral [22] : a 22-gauge [0.5% Bupivacaine___(mL)] : [unfilled] mL of 0.5% Bupivacaine [Methylpred. 40mg/mL___(mL)] : [unfilled] mL of 40mg/mL methylprednisolone [Bandage Applied] : a bandage [Tolerated Well] : The patient tolerated the procedure well [None] : none [PRN] : as needed [de-identified] : Meniscal tear [FreeTextEntry1] : ChloraPrep

## 2023-02-08 NOTE — DISCUSSION/SUMMARY
[de-identified] : 77-year-old woman with right knee meniscal tear\par -Risks and benefits of intra-articular injection were discussed at length with the patient the patient showed a good understanding of the risks and benefits.  She chose to move forward with an intra-articular injection.\par -Weightbearing as tolerated bilateral upper and lower extremities\par -Continue Physical therapy: begin to improve ROM and strengthen right knee\par -Meloxicam for pain\par -Follow-up as needed with xrays of the right knee at that time\par -All the patient's questions and concerns were addressed during this visit\par

## 2023-03-15 ENCOUNTER — APPOINTMENT (OUTPATIENT)
Dept: ORTHOPEDIC SURGERY | Facility: CLINIC | Age: 78
End: 2023-03-15
Payer: MEDICARE

## 2023-03-15 VITALS
SYSTOLIC BLOOD PRESSURE: 138 MMHG | DIASTOLIC BLOOD PRESSURE: 87 MMHG | BODY MASS INDEX: 28.19 KG/M2 | HEART RATE: 92 BPM | TEMPERATURE: 98 F | WEIGHT: 186 LBS | HEIGHT: 68 IN

## 2023-03-15 DIAGNOSIS — S52.511D DISPLACED FRACTURE OF RIGHT RADIAL STYLOID PROCESS, SUBSEQUENT ENCOUNTER FOR CLOSED FRACTURE WITH ROUTINE HEALING: ICD-10-CM

## 2023-03-15 PROCEDURE — 99213 OFFICE O/P EST LOW 20 MIN: CPT

## 2023-03-15 PROCEDURE — 73562 X-RAY EXAM OF KNEE 3: CPT | Mod: RT

## 2023-03-15 NOTE — PHYSICAL EXAM
[de-identified] : The patient is sitting comfortably in the exam room. \par Right knee\par -Skin is intact, no swelling, no ecchymosis\par -Slightly full in the posterior aspect of the knee compared to the contralateral side\par -Range of motion knee 0-130 with discomfort at full extension and flexion\par -Negative Lachman, negative anterior drawer, negative posterior drawer\par -Equivocal Rasta\par -Stable to varus and valgus stress\par -Sensation is intact L1-S1\par -5/5 EHL, FHL, TA, GS, quadriceps, hamstrings\par -Foot is warm and well-perfused, palpable dorsalis pedis pulse [de-identified] : Xrays of the right knee were taken in the office today, 3/15/23.  AP oblique and lateral.  X-rays show no change from previous images.  The patient has tricompartmental arthritis.\par \par \par

## 2023-03-15 NOTE — PROCEDURE
[Injection] : Injection [Right] : of the right [Knee Joint] : knee joint [Osteoarthritis] : Osteoarthritis [Patient] : patient [Risk] : risk [Benefits] : benefits [Alternatives] : alternatives [Bleeding] : bleeding [Infection] : infection [Allergic Reaction] : allergic reaction [Verbal Consent Obtained] : verbal consent was obtained prior to the procedure [Lateral] : lateral [22] : a 22-gauge [0.5% Bupivacaine___(mL)] : [unfilled] mL of 0.5% Bupivacaine [Methylpred. 40mg/mL___(mL)] : [unfilled] mL of 40mg/mL methylprednisolone [Bandage Applied] : a bandage [Tolerated Well] : The patient tolerated the procedure well [None] : none [PRN] : as needed [de-identified] : Meniscal tear [FreeTextEntry1] : ChloraPrep

## 2023-03-15 NOTE — HISTORY OF PRESENT ILLNESS
[de-identified] : YVON Minor is a 77 y.o. RHD woman who presents to the office today for follow up of right knee pain sustained on 2/25/22. Since her last visit she states she was feeling better the first 2.5 weeks after her cortisone injection and Physical Therapy. She began to have pain again after her vacation on a cruise and stopped PT on 2/9/23. Since being back she has been using Meloxicam which has helped relieve her pain. She is interested in another injection.

## 2023-03-15 NOTE — DISCUSSION/SUMMARY
[de-identified] : 77-year-old woman with right knee tricompartmental arthritis and meniscal tear, approximately 1 year out.\par -Long discussion was held with the patient regarding treatment options for her knee arthritis.  The risks and benefits of operative versus nonoperative management were discussed.  The patient is not interested in operative management at this time.\par -Weightbearing as tolerated bilateral upper and lower extremities\par -Continue Physical therapy: begin to improve ROM and strengthen right knee\par -Meloxicam for pain\par -Follow-up with one of our arthroplasty specialists for potential future surgery.\par -All the patient's questions and concerns were addressed during this visit\par

## 2023-03-20 ENCOUNTER — RX RENEWAL (OUTPATIENT)
Age: 78
End: 2023-03-20

## 2023-03-20 RX ORDER — MELOXICAM 15 MG/1
15 TABLET ORAL
Qty: 30 | Refills: 1 | Status: ACTIVE | COMMUNITY
Start: 2022-11-16 | End: 1900-01-01

## 2023-05-18 ENCOUNTER — APPOINTMENT (OUTPATIENT)
Dept: ORTHOPEDIC SURGERY | Facility: CLINIC | Age: 78
End: 2023-05-18
Payer: MEDICARE

## 2023-05-18 VITALS
SYSTOLIC BLOOD PRESSURE: 133 MMHG | DIASTOLIC BLOOD PRESSURE: 86 MMHG | HEART RATE: 82 BPM | OXYGEN SATURATION: 98 % | TEMPERATURE: 97.3 F | HEIGHT: 68 IN | BODY MASS INDEX: 29.25 KG/M2 | WEIGHT: 193 LBS

## 2023-05-18 DIAGNOSIS — M25.561 PAIN IN RIGHT KNEE: ICD-10-CM

## 2023-05-18 PROCEDURE — 99214 OFFICE O/P EST MOD 30 MIN: CPT | Mod: 25

## 2023-05-18 PROCEDURE — 20610 DRAIN/INJ JOINT/BURSA W/O US: CPT | Mod: RT

## 2023-06-23 ENCOUNTER — APPOINTMENT (OUTPATIENT)
Dept: ORTHOPEDIC SURGERY | Facility: CLINIC | Age: 78
End: 2023-06-23
Payer: MEDICARE

## 2023-06-23 VITALS
DIASTOLIC BLOOD PRESSURE: 88 MMHG | BODY MASS INDEX: 27.74 KG/M2 | HEIGHT: 68 IN | HEART RATE: 77 BPM | SYSTOLIC BLOOD PRESSURE: 137 MMHG | WEIGHT: 183 LBS

## 2023-06-23 DIAGNOSIS — M25.512 PAIN IN LEFT SHOULDER: ICD-10-CM

## 2023-06-23 PROCEDURE — 73030 X-RAY EXAM OF SHOULDER: CPT | Mod: LT

## 2023-06-23 PROCEDURE — 99214 OFFICE O/P EST MOD 30 MIN: CPT | Mod: 25

## 2023-06-23 PROCEDURE — 20610 DRAIN/INJ JOINT/BURSA W/O US: CPT | Mod: LT

## 2023-06-26 PROBLEM — M25.512 LEFT SHOULDER PAIN: Status: ACTIVE | Noted: 2023-06-23

## 2023-08-28 ENCOUNTER — APPOINTMENT (OUTPATIENT)
Dept: ORTHOPEDIC SURGERY | Facility: CLINIC | Age: 78
End: 2023-08-28
Payer: MEDICARE

## 2023-08-28 VITALS — WEIGHT: 183 LBS | BODY MASS INDEX: 27.74 KG/M2 | HEIGHT: 68 IN

## 2023-08-28 DIAGNOSIS — S46.912A STRAIN OF UNSPECIFIED MUSCLE, FASCIA AND TENDON AT SHOULDER AND UPPER ARM LEVEL, LEFT ARM, INITIAL ENCOUNTER: ICD-10-CM

## 2023-08-28 DIAGNOSIS — M17.11 UNILATERAL PRIMARY OSTEOARTHRITIS, RIGHT KNEE: ICD-10-CM

## 2023-08-28 DIAGNOSIS — S83.206A UNSPECIFIED TEAR OF UNSPECIFIED MENISCUS, CURRENT INJURY, RIGHT KNEE, INITIAL ENCOUNTER: ICD-10-CM

## 2023-08-28 PROCEDURE — 99213 OFFICE O/P EST LOW 20 MIN: CPT

## 2023-08-28 RX ORDER — MELOXICAM 15 MG/1
15 TABLET ORAL DAILY
Qty: 30 | Refills: 1 | Status: ACTIVE | COMMUNITY
Start: 2023-08-28 | End: 1900-01-01

## 2023-11-24 ENCOUNTER — APPOINTMENT (OUTPATIENT)
Dept: ORTHOPEDIC SURGERY | Facility: CLINIC | Age: 78
End: 2023-11-24
Payer: MEDICARE

## 2023-11-24 VITALS — BODY MASS INDEX: 27.89 KG/M2 | HEIGHT: 68 IN | WEIGHT: 184 LBS

## 2023-11-24 DIAGNOSIS — M17.11 UNILATERAL PRIMARY OSTEOARTHRITIS, RIGHT KNEE: ICD-10-CM

## 2023-11-24 PROCEDURE — 20610 DRAIN/INJ JOINT/BURSA W/O US: CPT | Mod: RT

## 2023-11-24 PROCEDURE — 73562 X-RAY EXAM OF KNEE 3: CPT | Mod: RT

## 2023-11-24 PROCEDURE — 99214 OFFICE O/P EST MOD 30 MIN: CPT | Mod: 25

## 2023-11-24 RX ORDER — MELOXICAM 15 MG/1
15 TABLET ORAL DAILY
Qty: 1 | Refills: 0 | Status: ACTIVE | COMMUNITY
Start: 2023-11-24 | End: 1900-01-01

## 2023-11-27 PROBLEM — M17.11 PRIMARY LOCALIZED OSTEOARTHRITIS OF RIGHT KNEE: Status: ACTIVE | Noted: 2023-11-27

## 2024-08-12 ENCOUNTER — NON-APPOINTMENT (OUTPATIENT)
Age: 79
End: 2024-08-12

## 2024-08-13 ENCOUNTER — APPOINTMENT (OUTPATIENT)
Dept: ORTHOPEDIC SURGERY | Facility: CLINIC | Age: 79
End: 2024-08-13
Payer: MEDICARE

## 2024-08-13 VITALS — HEIGHT: 67 IN | WEIGHT: 180 LBS | BODY MASS INDEX: 28.25 KG/M2

## 2024-08-13 DIAGNOSIS — M17.11 UNILATERAL PRIMARY OSTEOARTHRITIS, RIGHT KNEE: ICD-10-CM

## 2024-08-13 PROCEDURE — 73562 X-RAY EXAM OF KNEE 3: CPT | Mod: RT

## 2024-08-13 PROCEDURE — 20610 DRAIN/INJ JOINT/BURSA W/O US: CPT | Mod: RT

## 2024-08-13 PROCEDURE — 99213 OFFICE O/P EST LOW 20 MIN: CPT | Mod: 25

## 2024-09-03 NOTE — ASSESSMENT
[FreeTextEntry1] : IMP: 78 year female present with Gallbladder mass.      PLAN:  resection of mass

## 2024-09-03 NOTE — HISTORY OF PRESENT ILLNESS
[de-identified] : Ms. YVON OSBORN is a 78 year old female who present today for initial consultation for Gallbladder mass. Referred by Dr. Enrrique Houston.   CT abd/pelvis 7/17/24: Masslike dense material in the gallbladder of uncertain etiology, could be sludge, but a gallbladder malignancy is not entirely excluded. This would be best characterized with MRI abdomen with and without gadolinum to assess for an enhancing mass.   MRI abd 7/31/24: Polypoid enhancing mass in the gallbladder measuring 3.3 x 2.7 x 5.1 cm concerning for neoplastic process there are gallstones and biliary sludge within the gallbladder without pericholecystic inflammatory change or biliary ductal dilatation.

## 2024-09-05 ENCOUNTER — APPOINTMENT (OUTPATIENT)
Dept: SURGICAL ONCOLOGY | Facility: CLINIC | Age: 79
End: 2024-09-05

## 2024-09-11 ENCOUNTER — NON-APPOINTMENT (OUTPATIENT)
Age: 79
End: 2024-09-11

## 2024-09-12 ENCOUNTER — APPOINTMENT (OUTPATIENT)
Dept: SURGICAL ONCOLOGY | Facility: CLINIC | Age: 79
End: 2024-09-12
Payer: MEDICARE

## 2024-09-12 VITALS
SYSTOLIC BLOOD PRESSURE: 134 MMHG | BODY MASS INDEX: 28.25 KG/M2 | DIASTOLIC BLOOD PRESSURE: 76 MMHG | HEART RATE: 103 BPM | OXYGEN SATURATION: 97 % | HEIGHT: 67 IN | RESPIRATION RATE: 17 BRPM | WEIGHT: 180 LBS

## 2024-09-12 PROBLEM — K82.8 GALLBLADDER MASS: Status: ACTIVE | Noted: 2024-09-03

## 2024-09-12 PROCEDURE — 99205 OFFICE O/P NEW HI 60 MIN: CPT

## 2024-09-12 NOTE — HISTORY OF PRESENT ILLNESS
[de-identified] : Ms. YVON OSBORN is a 78 year old female who present today for initial consultation for Gallbladder mass. Referred by Dr. Enrrique Houston. Patient with history of anemia and underwent colonoscopy and capsule swallow study which was negative, CT and MRI were ordered for a completion of workup and was found to have a gall bladder mass, patient is asymptomatic.   CT abd/pelvis 7/17/24: Masslike dense material in the gallbladder of uncertain etiology, could be sludge, but a gallbladder malignancy is not entirely excluded. This would be best characterized with MRI abdomen with and without gadolinum to assess for an enhancing mass.   MRI abd 7/31/24: Polypoid enhancing mass in the gallbladder measuring 3.3 x 2.7 x 5.1 cm concerning for neoplastic process there are gallstones and biliary sludge within the gallbladder without pericholecystic inflammatory change or biliary ductal dilatation.    PMH: HTN, HLD, hypothyroidism Family History: denies PSH: thyroidectomy

## 2024-09-12 NOTE — CONSULT LETTER
[Dear  ___] : Dear  [unfilled], [Consult Letter:] : I had the pleasure of evaluating your patient, [unfilled]. [Please see my note below.] : Please see my note below. [Consult Closing:] : Thank you very much for allowing me to participate in the care of this patient.  If you have any questions, please do not hesitate to contact me. [Sincerely,] : Sincerely, [FreeTextEntry1] : I will keep you informed of the pathology of the cholecystectomy. [FreeTextEntry3] : Scott Schneider MD FACS Chief of Surgical Oncology

## 2024-09-12 NOTE — HISTORY OF PRESENT ILLNESS
[de-identified] : Ms. YVON OSBORN is a 78 year old female who present today for initial consultation for Gallbladder mass. Referred by Dr. Enrrique Houston. Patient with history of anemia and underwent colonoscopy and capsule swallow study which was negative, CT and MRI were ordered for a completion of workup and was found to have a gall bladder mass, patient is asymptomatic.   CT abd/pelvis 7/17/24: Masslike dense material in the gallbladder of uncertain etiology, could be sludge, but a gallbladder malignancy is not entirely excluded. This would be best characterized with MRI abdomen with and without gadolinum to assess for an enhancing mass.   MRI abd 7/31/24: Polypoid enhancing mass in the gallbladder measuring 3.3 x 2.7 x 5.1 cm concerning for neoplastic process there are gallstones and biliary sludge within the gallbladder without pericholecystic inflammatory change or biliary ductal dilatation.    PMH: HTN, HLD, hypothyroidism Family History: denies PSH: thyroidectomy

## 2024-09-12 NOTE — HISTORY OF PRESENT ILLNESS
[de-identified] : Ms. YVON OSBORN is a 78 year old female who present today for initial consultation for Gallbladder mass. Referred by Dr. Enrrique Houston. Patient with history of anemia and underwent colonoscopy and capsule swallow study which was negative, CT and MRI were ordered for a completion of workup and was found to have a gall bladder mass, patient is asymptomatic.   CT abd/pelvis 7/17/24: Masslike dense material in the gallbladder of uncertain etiology, could be sludge, but a gallbladder malignancy is not entirely excluded. This would be best characterized with MRI abdomen with and without gadolinum to assess for an enhancing mass.   MRI abd 7/31/24: Polypoid enhancing mass in the gallbladder measuring 3.3 x 2.7 x 5.1 cm concerning for neoplastic process there are gallstones and biliary sludge within the gallbladder without pericholecystic inflammatory change or biliary ductal dilatation.    PMH: HTN, HLD, hypothyroidism Family History: denies PSH: thyroidectomy

## 2024-09-12 NOTE — PHYSICAL EXAM
[Normal] : supple, no neck mass and thyroid not enlarged [Normal Supraclavicular Lymph Nodes] : normal supraclavicular lymph nodes [Normal Groin Lymph Nodes] : normal groin lymph nodes [Normal] : oriented to person, place and time, with appropriate affect [de-identified] : no masses or tenderness

## 2024-09-12 NOTE — PHYSICAL EXAM
[Normal] : supple, no neck mass and thyroid not enlarged [Normal Supraclavicular Lymph Nodes] : normal supraclavicular lymph nodes [Normal Groin Lymph Nodes] : normal groin lymph nodes [Normal] : oriented to person, place and time, with appropriate affect [de-identified] : no masses or tenderness

## 2024-09-12 NOTE — PHYSICAL EXAM
[Normal] : supple, no neck mass and thyroid not enlarged [Normal Supraclavicular Lymph Nodes] : normal supraclavicular lymph nodes [Normal Groin Lymph Nodes] : normal groin lymph nodes [Normal] : oriented to person, place and time, with appropriate affect [de-identified] : no masses or tenderness

## 2024-09-12 NOTE — ASSESSMENT
[FreeTextEntry1] : IMP: 78 year female presents with an asymptomatic gallbladder mass.   MRI abd 7/31/24: Polypoid enhancing mass in the gallbladder measuring 3.3 x 2.7 x 5.1 cm concerning for neoplastic process there are gallstones and biliary sludge within the gallbladder without pericholecystic inflammatory change or biliary ductal dilatation.   PLAN:  laparoscopic  cholecystectomy  If the pathology shows a malignancy, then a liver resection and portal node dissection would be done at a later date.

## 2024-09-17 ENCOUNTER — LABORATORY RESULT (OUTPATIENT)
Age: 79
End: 2024-09-17

## 2024-09-17 ENCOUNTER — APPOINTMENT (OUTPATIENT)
Dept: SURGICAL ONCOLOGY | Facility: CLINIC | Age: 79
End: 2024-09-17
Payer: MEDICARE

## 2024-09-17 VITALS
DIASTOLIC BLOOD PRESSURE: 78 MMHG | SYSTOLIC BLOOD PRESSURE: 124 MMHG | WEIGHT: 180 LBS | HEIGHT: 67 IN | OXYGEN SATURATION: 98 % | HEART RATE: 101 BPM | BODY MASS INDEX: 28.25 KG/M2

## 2024-09-17 DIAGNOSIS — Z63.4 DISAPPEARANCE AND DEATH OF FAMILY MEMBER: ICD-10-CM

## 2024-09-17 DIAGNOSIS — I10 ESSENTIAL (PRIMARY) HYPERTENSION: ICD-10-CM

## 2024-09-17 DIAGNOSIS — Z87.891 PERSONAL HISTORY OF NICOTINE DEPENDENCE: ICD-10-CM

## 2024-09-17 PROCEDURE — 99214 OFFICE O/P EST MOD 30 MIN: CPT

## 2024-09-17 RX ORDER — VIBEGRON 75 MG/1
TABLET, FILM COATED ORAL
Refills: 0 | Status: ACTIVE | COMMUNITY

## 2024-09-17 RX ORDER — IRON/IRON ASP GLY/FA/MV-MIN 38 125-25-1MG
TABLET ORAL
Refills: 0 | Status: ACTIVE | COMMUNITY

## 2024-09-17 RX ORDER — LEVOTHYROXINE SODIUM 137 UG/1
TABLET ORAL
Refills: 0 | Status: ACTIVE | COMMUNITY

## 2024-09-17 RX ORDER — ROSUVASTATIN CALCIUM 5 MG/1
TABLET, FILM COATED ORAL
Refills: 0 | Status: ACTIVE | COMMUNITY

## 2024-09-17 RX ORDER — RAMIPRIL 5 MG/1
CAPSULE ORAL
Refills: 0 | Status: ACTIVE | COMMUNITY

## 2024-09-17 RX ORDER — CHROMIUM 200 MCG
TABLET ORAL
Refills: 0 | Status: ACTIVE | COMMUNITY

## 2024-09-17 SDOH — SOCIAL STABILITY - SOCIAL INSECURITY: DISSAPEARANCE AND DEATH OF FAMILY MEMBER: Z63.4

## 2024-09-18 ENCOUNTER — NON-APPOINTMENT (OUTPATIENT)
Age: 79
End: 2024-09-18

## 2024-09-19 ENCOUNTER — APPOINTMENT (OUTPATIENT)
Dept: SURGICAL ONCOLOGY | Facility: CLINIC | Age: 79
End: 2024-09-19

## 2024-09-19 LAB
ALBUMIN SERPL ELPH-MCNC: 4.6 G/DL
ALP BLD-CCNC: 74 U/L
ALT SERPL-CCNC: 25 U/L
ANION GAP SERPL CALC-SCNC: 14 MMOL/L
AST SERPL-CCNC: 31 U/L
BASOPHILS # BLD AUTO: 0.08 K/UL
BASOPHILS NFR BLD AUTO: 1.7 %
BILIRUB SERPL-MCNC: <0.2 MG/DL
BUN SERPL-MCNC: 23 MG/DL
CALCIUM SERPL-MCNC: 8.7 MG/DL
CANCER AG19-9 SERPL-ACNC: <2 U/ML
CEA SERPL-MCNC: 1 NG/ML
CHLORIDE SERPL-SCNC: 106 MMOL/L
CO2 SERPL-SCNC: 23 MMOL/L
CREAT SERPL-MCNC: 1.03 MG/DL
EGFR: 55 ML/MIN/1.73M2
EOSINOPHIL # BLD AUTO: 0.04 K/UL
EOSINOPHIL NFR BLD AUTO: 0.9 %
GLUCOSE SERPL-MCNC: 89 MG/DL
HCT VFR BLD CALC: 28.6 %
HGB BLD-MCNC: 7.7 G/DL
LYMPHOCYTES # BLD AUTO: 1.7 K/UL
LYMPHOCYTES NFR BLD AUTO: 36.3 %
MAN DIFF?: NORMAL
MCHC RBC-ENTMCNC: 20.1 PG
MCHC RBC-ENTMCNC: 26.9 GM/DL
MCV RBC AUTO: 74.7 FL
MONOCYTES # BLD AUTO: 0.33 K/UL
MONOCYTES NFR BLD AUTO: 7.1 %
NEUTROPHILS # BLD AUTO: 2.52 K/UL
NEUTROPHILS NFR BLD AUTO: 54 %
PLATELET # BLD AUTO: 355 K/UL
POTASSIUM SERPL-SCNC: 5.1 MMOL/L
PROT SERPL-MCNC: 7.1 G/DL
RBC # BLD: 3.83 M/UL
RBC # FLD: 18.3 %
SODIUM SERPL-SCNC: 143 MMOL/L
WBC # FLD AUTO: 4.67 K/UL

## 2024-09-22 NOTE — ASSESSMENT
[FreeTextEntry1] : IMP: 78-year female presents with an asymptomatic gallbladder mass.   MRI abd 7/31/24: Polypoid enhancing mass in the gallbladder measuring 3.3 x 2.7 x 5.1 cm concerning for neoplastic process there are gallstones and biliary sludge within the gallbladder without pericholecystic inflammatory change or biliary ductal dilatation.   PLAN:  -Labs ordered, cbc, cmp, CEA, CA 19-9- Results discussed with patient. Will get prior results with Dr. Hsu office. Pt has started Iron tablets, will f/u with Dr. Rodriguez Monday.  -CT chest ([facility]) r/o METs -Robotic cholecystectomy with possible liver rsxn based on intraop frozen - Pt will need preop medical optimization/ clearance   I have discussed the diagnosis, therapeutic plan and options with the patient at length. Patient expressed verbal understanding to proceed with proposed plan. All questions answered.   I have discussed the risks, benefits, alternatives, complications including but not limited bleeding, infection, damage to adjacent structures, sepsis, need for further procedures, bile duct injury, sepsis, tumor recurrence to the patient in detail. Patient expressed verbal understanding. Written informed consent to be obtained in the preoperative period.

## 2024-09-22 NOTE — HISTORY OF PRESENT ILLNESS
[de-identified] : Ms. YVON OSBORN is a 78-year-old female who present today for initial consultation for Gallbladder mass. Referred by Dr. Schneider. Patient with history of anemia and underwent colonoscopy and capsule swallow study which was negative, CT and MRI were ordered for a completion of workup and was found to have a gall bladder mass, patient is asymptomatic.   CT abd/pelvis 7/17/24: Masslike dense material in the gallbladder of uncertain etiology, could be sludge, but a gallbladder malignancy is not entirely excluded. This would be best characterized with MRI abdomen with and without gadolinum to assess for an enhancing mass.   MRI abd 7/31/24: Polypoid enhancing mass in the gallbladder measuring 3.3 x 2.7 x 5.1 cm concerning for neoplastic process there are gallstones and biliary sludge within the gallbladder without pericholecystic inflammatory change or biliary ductal dilatation.   9/17/2024 H/H 7.7/28.6, GFR 55, CA 19-9 <2, CEA 1.0- Discussed results with patient. She's asymptomatioc and taking Iron tablets. Has a f/u jordin with Dr. Rodriguez next Monday. Will obtain prior results to compare.   PMH: HTN, HLD, hypothyroidism Family History: denies PSH: thyroidectomy  PCP: Shaheen Parham[address] [place] [address] [phone]

## 2024-09-22 NOTE — PHYSICAL EXAM
[Normal] : supple, no neck mass and thyroid not enlarged [Normal Supraclavicular Lymph Nodes] : normal supraclavicular lymph nodes [Normal Groin Lymph Nodes] : normal groin lymph nodes [Normal] : oriented to person, place and time, with appropriate affect [FreeTextEntry1] :   COVID -19 precautions as per [facility] policy was universally followed. [de-identified] : , Abdomen soft, nontender, nondistended, no palpable masses.

## 2024-09-22 NOTE — CONSULT LETTER
[Dear  ___] : Dear  [unfilled], [Consult Letter:] : I had the pleasure of evaluating your patient, [unfilled]. [Please see my note below.] : Please see my note below. [Consult Closing:] : Thank you very much for allowing me to participate in the care of this patient.  If you have any questions, please do not hesitate to contact me. [Sincerely,] : Sincerely, [DrJeanne  ___] : Dr. KHAN [FreeTextEntry1] : I will keep you informed of the pathology of the cholecystectomy. [FreeTextEntry3] : Regards Jeff Talamantes MD, NORY, FACS Director of Surgical Oncology- [facility] , Department of Surgery [facility] at [address] [address] Ph: [phone] Cell: [phone]

## 2024-09-22 NOTE — CONSULT LETTER
[Dear  ___] : Dear  [unfilled], [Consult Letter:] : I had the pleasure of evaluating your patient, [unfilled]. [Please see my note below.] : Please see my note below. [Consult Closing:] : Thank you very much for allowing me to participate in the care of this patient.  If you have any questions, please do not hesitate to contact me. [Sincerely,] : Sincerely, [DrJeanne  ___] : Dr. KHAN [FreeTextEntry1] : I will keep you informed of the pathology of the cholecystectomy. [FreeTextEntry3] : Regards Jeff Talamantes MD, NORY, FACS Director of Surgical Oncology- [facility] , Department of Surgery [facility] at [address] [address] Ph: [phone] Cell: [phone]

## 2024-09-22 NOTE — PHYSICAL EXAM
[Normal] : supple, no neck mass and thyroid not enlarged [Normal Supraclavicular Lymph Nodes] : normal supraclavicular lymph nodes [Normal Groin Lymph Nodes] : normal groin lymph nodes [Normal] : oriented to person, place and time, with appropriate affect [FreeTextEntry1] :   COVID -19 precautions as per [facility] policy was universally followed. [de-identified] : , Abdomen soft, nontender, nondistended, no palpable masses.

## 2024-09-22 NOTE — HISTORY OF PRESENT ILLNESS
[de-identified] : Ms. YVON OSBORN is a 78-year-old female who present today for initial consultation for Gallbladder mass. Referred by Dr. Schneider. Patient with history of anemia and underwent colonoscopy and capsule swallow study which was negative, CT and MRI were ordered for a completion of workup and was found to have a gall bladder mass, patient is asymptomatic.   CT abd/pelvis 7/17/24: Masslike dense material in the gallbladder of uncertain etiology, could be sludge, but a gallbladder malignancy is not entirely excluded. This would be best characterized with MRI abdomen with and without gadolinum to assess for an enhancing mass.   MRI abd 7/31/24: Polypoid enhancing mass in the gallbladder measuring 3.3 x 2.7 x 5.1 cm concerning for neoplastic process there are gallstones and biliary sludge within the gallbladder without pericholecystic inflammatory change or biliary ductal dilatation.   9/17/2024 H/H 7.7/28.6, GFR 55, CA 19-9 <2, CEA 1.0- Discussed results with patient. She's asymptomatioc and taking Iron tablets. Has a f/u jordin with Dr. Rodriguez next Monday. Will obtain prior results to compare.   PMH: HTN, HLD, hypothyroidism Family History: denies PSH: thyroidectomy  PCP: Shaheen [address]ham-[address] [place] [address] [phone]

## 2024-09-22 NOTE — PHYSICAL EXAM
[Normal] : supple, no neck mass and thyroid not enlarged [Normal Supraclavicular Lymph Nodes] : normal supraclavicular lymph nodes [Normal Groin Lymph Nodes] : normal groin lymph nodes [Normal] : oriented to person, place and time, with appropriate affect [FreeTextEntry1] :   COVID -19 precautions as per [facility] policy was universally followed. [de-identified] : , Abdomen soft, nontender, nondistended, no palpable masses.

## 2024-09-22 NOTE — CONSULT LETTER
[Dear  ___] : Dear  [unfilled], [Consult Letter:] : I had the pleasure of evaluating your patient, [unfilled]. [Please see my note below.] : Please see my note below. [Consult Closing:] : Thank you very much for allowing me to participate in the care of this patient.  If you have any questions, please do not hesitate to contact me. [Sincerely,] : Sincerely, [DrJeanne  ___] : Dr. KHAN [FreeTextEntry1] : I will keep you informed of the pathology of the cholecystectomy. [FreeTextEntry3] : Regards Jeff Talamantes MD, NORY, FACS Director of Surgical Oncology- [facility] , Department of Surgery [facility] at [address] [address] Ph: [phone] Cell: [phone]

## 2024-09-22 NOTE — ASSESSMENT
[FreeTextEntry1] : IMP: 78-year female presents with an asymptomatic gallbladder mass.   MRI abd 7/31/24: Polypoid enhancing mass in the gallbladder measuring 3.3 x 2.7 x 5.1 cm concerning for neoplastic process there are gallstones and biliary sludge within the gallbladder without pericholecystic inflammatory change or biliary ductal dilatation.   PLAN:  -Labs ordered, cbc, cmp, CEA, CA 19-9- Results discussed with patient. Will get prior results with Dr. Hsu office. Pt has started Iron tablets, will f/u with Dr. Rodriguez Monday.  -[facility] chest ([facility]) r/o METs -Robotic cholecystectomy with possible liver rsxn based on intraop frozen - Pt will need preop medical optimization/ clearance   I have discussed the diagnosis, therapeutic plan and options with the patient at length. Patient expressed verbal understanding to proceed with proposed plan. All questions answered.   I have discussed the risks, benefits, alternatives, complications including but not limited bleeding, infection, damage to adjacent structures, sepsis, need for further procedures, bile duct injury, sepsis, tumor recurrence to the patient in detail. Patient expressed verbal understanding. Written informed consent to be obtained in the preoperative period.

## 2024-09-22 NOTE — ASSESSMENT
[FreeTextEntry1] : IMP: 78-year female presents with an asymptomatic gallbladder mass.   MRI abd 7/31/24: Polypoid enhancing mass in the gallbladder measuring 3.3 x 2.7 x 5.1 cm concerning for neoplastic process there are gallstones and biliary sludge within the gallbladder without pericholecystic inflammatory change or biliary ductal dilatation.   PLAN:  -Labs ordered, cbc, cmp, CEA, CA 19-9- Results discussed with patient. Will get prior results with Dr. Hsu office. Pt has started Iron tablets, will f/u with Dr. Rodriguez Monday.  -CT chest ([facility]) r/o METs -Robotic cholecystectomy with possible liver rsxn based on intraop frozen - Pt will need preop medical optimization/ clearance   I have discussed the diagnosis, therapeutic plan and options with the patient at length. Patient expressed verbal understanding to proceed with proposed plan. All questions answered.   I have discussed the risks, benefits, alternatives, complications including but not limited bleeding, infection, damage to adjacent structures, sepsis, need for further procedures, bile duct injury, sepsis, tumor recurrence to the patient in detail. Patient expressed verbal understanding. Written informed consent to be obtained in the preoperative period.

## 2024-09-22 NOTE — HISTORY OF PRESENT ILLNESS
[de-identified] : Ms. YVON OSBORN is a 78-year-old female who present today for initial consultation for Gallbladder mass. Referred by Dr. Schneider. Patient with history of anemia and underwent colonoscopy and capsule swallow study which was negative, CT and MRI were ordered for a completion of workup and was found to have a gall bladder mass, patient is asymptomatic.   CT abd/pelvis 7/17/24: Masslike dense material in the gallbladder of uncertain etiology, could be sludge, but a gallbladder malignancy is not entirely excluded. This would be best characterized with MRI abdomen with and without gadolinum to assess for an enhancing mass.   MRI abd 7/31/24: Polypoid enhancing mass in the gallbladder measuring 3.3 x 2.7 x 5.1 cm concerning for neoplastic process there are gallstones and biliary sludge within the gallbladder without pericholecystic inflammatory change or biliary ductal dilatation.   9/17/2024 H/H 7.7/28.6, GFR 55, CA 19-9 <2, CEA 1.0- Discussed results with patient. She's asymptomatioc and taking Iron tablets. Has a f/u jordin with Dr. Rodriguez next Monday. Will obtain prior results to compare.   PMH: HTN, HLD, hypothyroidism Family History: denies PSH: thyroidectomy  PCP: Shaheen [place]ham-[address] [place] [address] [phone]

## 2024-09-24 ENCOUNTER — APPOINTMENT (OUTPATIENT)
Dept: SURGICAL ONCOLOGY | Facility: CLINIC | Age: 79
End: 2024-09-24
Payer: MEDICARE

## 2024-09-24 DIAGNOSIS — K82.8 OTHER SPECIFIED DISEASES OF GALLBLADDER: ICD-10-CM

## 2024-09-24 PROCEDURE — 99214 OFFICE O/P EST MOD 30 MIN: CPT

## 2024-09-25 NOTE — HISTORY OF PRESENT ILLNESS
[de-identified] : Ms. YVON OSBORN is a 78-year-old female who present today for initial consultation for Gallbladder mass. Referred by Dr. Schneider. Patient with history of anemia and underwent colonoscopy and capsule swallow study which was negative, CT and MRI were ordered for a completion of workup and was found to have a gall bladder mass, patient is asymptomatic.   CT abd/pelvis 7/17/24: Masslike dense material in the gallbladder of uncertain etiology, could be sludge, but a gallbladder malignancy is not entirely excluded. This would be best characterized with MRI abdomen with and without gadolinum to assess for an enhancing mass.   MRI abd 7/31/24: Polypoid enhancing mass in the gallbladder measuring 3.3 x 2.7 x 5.1 cm concerning for neoplastic process there are gallstones and biliary sludge within the gallbladder without pericholecystic inflammatory change or biliary ductal dilatation.   9/17/2024 H/H 7.7/28.6, GFR 55, CA 19-9 <2, CEA 1.0- Discussed results with patient. She's asymptomatioc and taking Iron tablets. Has a f/u jordin with Dr. Rodriguez next Monday. Will obtain prior results to compare.   9/24/24 Pt feels well. Will discuss with Dr. Rodriguez plans for vascular lesion.   PMH: HTN, HLD, hypothyroidism Family History: denies PSH: thyroidectomy  PCP: Shaheen [place]ham-[address] [place] [address] [phone]

## 2024-09-25 NOTE — HISTORY OF PRESENT ILLNESS
[de-identified] : Ms. YVON OSBORN is a 78-year-old female who present today for initial consultation for Gallbladder mass. Referred by Dr. Schneider. Patient with history of anemia and underwent colonoscopy and capsule swallow study which was negative, CT and MRI were ordered for a completion of workup and was found to have a gall bladder mass, patient is asymptomatic.   CT abd/pelvis 7/17/24: Masslike dense material in the gallbladder of uncertain etiology, could be sludge, but a gallbladder malignancy is not entirely excluded. This would be best characterized with MRI abdomen with and without gadolinum to assess for an enhancing mass.   MRI abd 7/31/24: Polypoid enhancing mass in the gallbladder measuring 3.3 x 2.7 x 5.1 cm concerning for neoplastic process there are gallstones and biliary sludge within the gallbladder without pericholecystic inflammatory change or biliary ductal dilatation.   9/17/2024 H/H 7.7/28.6, GFR 55, CA 19-9 <2, CEA 1.0- Discussed results with patient. She's asymptomatioc and taking Iron tablets. Has a f/u jordin with Dr. Rodriguez next Monday. Will obtain prior results to compare.   9/24/24 Pt feels well. Will discuss with Dr. Rodriguez plans for vascular lesion.   PMH: HTN, HLD, hypothyroidism Family History: denies PSH: thyroidectomy  PCP: Shaheen [place]ham-[address] [place] [address] [phone]

## 2024-09-25 NOTE — ASSESSMENT
[FreeTextEntry1] : IMP: 78-year female presents with an asymptomatic gallbladder mass.   MRI abd 7/31/24: Polypoid enhancing mass in the gallbladder measuring 3.3 x 2.7 x 5.1 cm concerning for neoplastic process there are gallstones and biliary sludge within the gallbladder without pericholecystic inflammatory change or biliary ductal dilatation.   PLAN:  -Recent colonoscopy revealed an AVM - given her work up for anemia and need for planned surgery- would have the AVM treated preop- Discussed with  - referred to  -Plan for robotic cholecystectomy and liver rsxn 10/24.   I have discussed the diagnosis, therapeutic plan and options with the patient at length. Patient expressed verbal understanding to proceed with proposed plan. All questions answered.

## 2024-09-25 NOTE — REASON FOR VISIT
[Home] : at home, [unfilled] , at the time of the visit. [Medical Office: ([facility])___] : at the medical office located in  [Patient] : the patient [Follow-Up Visit] : a follow-up visit for [FreeTextEntry2] : gallbladder mass

## 2024-09-25 NOTE — CONSULT LETTER
[Dear  ___] : Dear  [unfilled], [Consult Letter:] : I had the pleasure of evaluating your patient, [unfilled]. [Please see my note below.] : Please see my note below. [Consult Closing:] : Thank you very much for allowing me to participate in the care of this patient.  If you have any questions, please do not hesitate to contact me. [Sincerely,] : Sincerely, [DrJeanne  ___] : Dr. KHAN [( Thank you for referring [unfilled] for consultation for _____ )] : Thank you for referring [unfilled] for consultation for [unfilled] [FreeTextEntry1] : I will keep you informed of the pathology of the cholecystectomy. [FreeTextEntry3] : Regards Jeff Talamantes MD, NORY, FACS Director of Surgical Oncology- [facility] , Department of Surgery [facility] at [address] [address] Ph: [phone] Cell: [phone] [DrJeanne ___] : Dr. KHAN

## 2024-09-25 NOTE — CONSULT LETTER
[Dear  ___] : Dear  [unfilled], [Consult Letter:] : I had the pleasure of evaluating your patient, [unfilled]. [Please see my note below.] : Please see my note below. [Consult Closing:] : Thank you very much for allowing me to participate in the care of this patient.  If you have any questions, please do not hesitate to contact me. [Sincerely,] : Sincerely, [DrJeanne  ___] : Dr. KHAN [( Thank you for referring [unfilled] for consultation for _____ )] : Thank you for referring [unfilled] for consultation for [unfilled] [FreeTextEntry1] : I will keep you informed of the pathology of the cholecystectomy. [FreeTextEntry3] : Regards Jeff Talamantes MD, NORY, FACS Director of Surgical Oncology- [facility] , Department of Surgery [facility] at [address] [address] Ph: [phone] Cell: [phone] [DrJeanne ___] : Dr. KHAN

## 2024-09-25 NOTE — REASON FOR VISIT
[Home] : at home, [unfilled] , at the time of the visit. [Medical Office: ([facility])___] : at the medical office located in  [Patient] : the patient [Follow-Up Visit] : a follow-up visit for [FreeTextEntry2] : gallbladder mass

## 2024-09-26 ENCOUNTER — NON-APPOINTMENT (OUTPATIENT)
Age: 79
End: 2024-09-26

## 2024-10-14 ENCOUNTER — OUTPATIENT (OUTPATIENT)
Dept: OUTPATIENT SERVICES | Facility: HOSPITAL | Age: 79
LOS: 1 days | End: 2024-10-14
Payer: MEDICARE

## 2024-10-14 ENCOUNTER — TRANSCRIPTION ENCOUNTER (OUTPATIENT)
Age: 79
End: 2024-10-14

## 2024-10-14 VITALS
DIASTOLIC BLOOD PRESSURE: 62 MMHG | RESPIRATION RATE: 15 BRPM | OXYGEN SATURATION: 97 % | HEART RATE: 76 BPM | SYSTOLIC BLOOD PRESSURE: 105 MMHG

## 2024-10-14 VITALS
HEIGHT: 68 IN | RESPIRATION RATE: 17 BRPM | HEART RATE: 90 BPM | OXYGEN SATURATION: 100 % | TEMPERATURE: 97 F | DIASTOLIC BLOOD PRESSURE: 71 MMHG | SYSTOLIC BLOOD PRESSURE: 127 MMHG | WEIGHT: 179.9 LBS

## 2024-10-14 DIAGNOSIS — K82.8 OTHER SPECIFIED DISEASES OF GALLBLADDER: ICD-10-CM

## 2024-10-14 DIAGNOSIS — K55.20 ANGIODYSPLASIA OF COLON WITHOUT HEMORRHAGE: ICD-10-CM

## 2024-10-14 DIAGNOSIS — D64.9 ANEMIA, UNSPECIFIED: ICD-10-CM

## 2024-10-14 DIAGNOSIS — E89.0 POSTPROCEDURAL HYPOTHYROIDISM: Chronic | ICD-10-CM

## 2024-10-14 PROBLEM — Z78.9 OTHER SPECIFIED HEALTH STATUS: Chronic | Status: INACTIVE | Noted: 2022-02-25 | Resolved: 2024-10-14

## 2024-10-14 DEVICE — CLIP RESOLUTION 360 235CM: Type: IMPLANTABLE DEVICE | Status: FUNCTIONAL

## 2024-10-14 RX ORDER — SODIUM CHLORIDE 0.9 % (FLUSH) 0.9 %
500 SYRINGE (ML) INJECTION
Refills: 0 | Status: COMPLETED | OUTPATIENT
Start: 2024-10-14 | End: 2024-10-14

## 2024-10-14 RX ADMIN — Medication 75 MILLILITER(S): at 10:13

## 2024-10-14 NOTE — PRE PROCEDURE NOTE - PRE PROCEDURE EVALUATION
Attending Physician:     Manish Gaston MD                       Procedure:  Colonoscopy    Indication for Procedure:  Anemia with AVMs  ________________________________________________________  PAST MEDICAL & SURGICAL HISTORY:  Hypertension      Hyperlipidemia      H/O thyroidectomy        ALLERGIES:  No Known Allergies    HOME MEDICATIONS:  folic acid 1 mg oral tablet: 1 tab(s) orally  Gemtesa 75 mg oral tablet: 1 tab(s) orally  latanoprost 0.005% ophthalmic emulsion: 1 drop(s) in each affected eye  ramipril 10 mg oral capsule: 1 cap(s) orally  rosuvastatin 20 mg oral tablet: 1 tab(s) orally  Synthroid 137 mcg (0.137 mg) oral tablet: 1 tab(s) orally    AICD/PPM: [ ] yes   [x] no    PERTINENT LAB DATA:                      PHYSICAL EXAMINATION:    Height (cm): 172.7  Weight (kg): 81.6  BMI (kg/m2): 27.4  BSA (m2): 1.95T(C): --  HR: --  BP: --  RR: --  SpO2: --    Constitutional: NAD  HEENT: PERRLA, EOMI,    Neck:  No JVD  Respiratory: CTAB/L  Cardiovascular: S1 and S2  Gastrointestinal: BS+, soft, NT/ND  Extremities: No peripheral edema  Neurological: A/O x 3, no focal deficits  Psychiatric: Normal mood, normal affect  Skin: No rashes    ASA Class: I [ ]  II [ ]  III [x ]  IV [ ]    COMMENTS:    The patient is a suitable candidate for the planned procedure unless box checked [ ]  No, explain:

## 2024-10-14 NOTE — PRE-ANESTHESIA EVALUATION ADULT - NSANTHOSAYNRD_GEN_A_CORE
No. PO screening performed.  STOP BANG Legend: 0-2 = LOW Risk; 3-4 = INTERMEDIATE Risk; 5-8 = HIGH Risk

## 2024-10-14 NOTE — ASU PREOP CHECKLIST - NS PREOP CHK MONITOR ANESTHESIA CONSENT
done
[FreeTextEntry1] : Orthopedic LBP - ADvised pt to continue with the heating pad since it provides relief.  Advised to start  Physical therapy. Ordered MRI to evaluate worsening of the lumbar stenosis or herniated disc.  Discussed weight loss.\par NSAIDs  PRN.  Check vitamin D and U/a \par \par Cardio - Hypertension -  Patient was educated about hypertension and the importance of controlling the pressure through lifestyle modification which include low sodium  diet and  aerobic  exercise.  Also discussed the use of prescription medication which included their benefits and their side effects.RTO in a few weeks for repeat blood pressure check. Continue with losartan 100 mg daily  \par Reviewed echo from 2020 with the patient  and discussed the Physical finding of a heart murmur as well as the finding on her echo that confirmed it\par Hyperlipidemia -   Advised low fat cholesterol diet.  Advised importance of having low cholesterol / LDL/ triglycerides. Advised life style modifications.  Continue with current medications.  Check FLP\par Eddo\par Hypothyroidism - Check TSH and free t4-  Continue with current dose of levothyroxine \par   Adult BMI screening and followup:  The patient's BMI is about 35  Counseled patient regarding BMI, healthy eating, portion control and exercise importance of diet to maintain a healthy weight. \par Counseled patient on importance of exercise to maintain a healthy weight \par Check TSH and a1c \par \par Vascular -  ulceration noted on toes - skin color changes distal lower ext   Check art. Doppler for stenosis \par \par Patient  education  - COVID-19   Counseling and education provided to the patient.  Advised sign and symptoms of the virus.  Advised contact precautions.  Educated patient on proper hand washing and to participate in social distancing. . \par \par Patient is in full awareness of the plan and agrees to it.  All pt question was answered.  \par \par  I spent  32 Minutes with the patient, half of which we discussed finding on physical exam  and coordinated care.  As well as reviewed my plans and follow ups.

## 2024-10-17 ENCOUNTER — OUTPATIENT (OUTPATIENT)
Dept: OUTPATIENT SERVICES | Facility: HOSPITAL | Age: 79
LOS: 1 days | End: 2024-10-17

## 2024-10-17 VITALS
SYSTOLIC BLOOD PRESSURE: 115 MMHG | RESPIRATION RATE: 16 BRPM | DIASTOLIC BLOOD PRESSURE: 75 MMHG | HEART RATE: 78 BPM | HEIGHT: 67.5 IN | TEMPERATURE: 99 F | WEIGHT: 186.07 LBS | OXYGEN SATURATION: 99 %

## 2024-10-17 DIAGNOSIS — Z98.49 CATARACT EXTRACTION STATUS, UNSPECIFIED EYE: Chronic | ICD-10-CM

## 2024-10-17 DIAGNOSIS — K82.8 OTHER SPECIFIED DISEASES OF GALLBLADDER: ICD-10-CM

## 2024-10-17 DIAGNOSIS — Z98.890 OTHER SPECIFIED POSTPROCEDURAL STATES: Chronic | ICD-10-CM

## 2024-10-17 DIAGNOSIS — D64.9 ANEMIA, UNSPECIFIED: ICD-10-CM

## 2024-10-17 DIAGNOSIS — E89.0 POSTPROCEDURAL HYPOTHYROIDISM: Chronic | ICD-10-CM

## 2024-10-17 LAB
A1C WITH ESTIMATED AVERAGE GLUCOSE RESULT: 4.7 % — SIGNIFICANT CHANGE UP (ref 4–5.6)
ALBUMIN SERPL ELPH-MCNC: 4.1 G/DL — SIGNIFICANT CHANGE UP (ref 3.3–5)
ALP SERPL-CCNC: 66 U/L — SIGNIFICANT CHANGE UP (ref 40–120)
ALT FLD-CCNC: 17 U/L — SIGNIFICANT CHANGE UP (ref 4–33)
ANION GAP SERPL CALC-SCNC: 13 MMOL/L — SIGNIFICANT CHANGE UP (ref 7–14)
APTT BLD: 30.5 SEC — SIGNIFICANT CHANGE UP (ref 24.5–35.6)
AST SERPL-CCNC: 22 U/L — SIGNIFICANT CHANGE UP (ref 4–32)
BILIRUB SERPL-MCNC: <0.2 MG/DL — SIGNIFICANT CHANGE UP (ref 0.2–1.2)
BLD GP AB SCN SERPL QL: NEGATIVE — SIGNIFICANT CHANGE UP
BUN SERPL-MCNC: 14 MG/DL — SIGNIFICANT CHANGE UP (ref 7–23)
CALCIUM SERPL-MCNC: 8.7 MG/DL — SIGNIFICANT CHANGE UP (ref 8.4–10.5)
CHLORIDE SERPL-SCNC: 103 MMOL/L — SIGNIFICANT CHANGE UP (ref 98–107)
CO2 SERPL-SCNC: 23 MMOL/L — SIGNIFICANT CHANGE UP (ref 22–31)
CREAT SERPL-MCNC: 0.77 MG/DL — SIGNIFICANT CHANGE UP (ref 0.5–1.3)
EGFR: 78 ML/MIN/1.73M2 — SIGNIFICANT CHANGE UP
ESTIMATED AVERAGE GLUCOSE: 88 — SIGNIFICANT CHANGE UP
GLUCOSE SERPL-MCNC: 80 MG/DL — SIGNIFICANT CHANGE UP (ref 70–99)
INR BLD: 0.93 RATIO — SIGNIFICANT CHANGE UP (ref 0.85–1.16)
POTASSIUM SERPL-MCNC: 4.1 MMOL/L — SIGNIFICANT CHANGE UP (ref 3.5–5.3)
POTASSIUM SERPL-SCNC: 4.1 MMOL/L — SIGNIFICANT CHANGE UP (ref 3.5–5.3)
PROT SERPL-MCNC: 6.9 G/DL — SIGNIFICANT CHANGE UP (ref 6–8.3)
PROTHROM AB SERPL-ACNC: 11.1 SEC — SIGNIFICANT CHANGE UP (ref 9.9–13.4)
RH IG SCN BLD-IMP: POSITIVE — SIGNIFICANT CHANGE UP
RH IG SCN BLD-IMP: POSITIVE — SIGNIFICANT CHANGE UP
SODIUM SERPL-SCNC: 139 MMOL/L — SIGNIFICANT CHANGE UP (ref 135–145)

## 2024-10-17 RX ORDER — SODIUM CHLORIDE 9 MG/ML
3 INJECTION, SOLUTION INTRAMUSCULAR; INTRAVENOUS; SUBCUTANEOUS EVERY 8 HOURS
Refills: 0 | Status: DISCONTINUED | OUTPATIENT
Start: 2024-10-28 | End: 2024-10-31

## 2024-10-17 NOTE — H&P PST ADULT - NSICDXPASTSURGICALHX_GEN_ALL_CORE_FT
PAST SURGICAL HISTORY:  H/O thyroidectomy     S/P colonoscopy      PAST SURGICAL HISTORY:  H/O hemorrhoidectomy     H/O thyroidectomy     S/P cataract surgery     S/P colonoscopy

## 2024-10-17 NOTE — H&P PST ADULT - NEGATIVE GASTROINTESTINAL SYMPTOMS
no nausea/no vomiting/no diarrhea/no constipation/no abdominal pain/no jaundice no nausea/no vomiting/no diarrhea/no constipation/no change in bowel habits/no abdominal pain/no jaundice

## 2024-10-17 NOTE — H&P PST ADULT - PROBLEM SELECTOR PLAN 1
preop for robotic cholecystectomy and possible liver resection on 10/24/24  preop instructions given, pt verbalized understanding  GI prophylaxis and chlorhexidine wash provided  PST cbc anemia workup, bmp, coags, hga1c, type and ABO pending  Pt will take her prescribed blood pressure and thyroid medication night before surgery preop for robotic cholecystectomy and possible liver resection on 10/24/24  preop instructions given, pt verbalized understanding  GI prophylaxis and chlorhexidine wash provided  PST cbc anemia workup, bmp, coags, hga1c, type and ABO pending  Pt will take her prescribed blood pressure and thyroid medication night before surgery  Pt was evaluated by her PCP/ Cardiologist Dr. Gorman on 10/10/24 and "medically cleared for procedure".  ECHO and stress test report requested in PST

## 2024-10-17 NOTE — H&P PST ADULT - PATIENT'S PREFERRED PRONOUN
Anticoagulation Progress Note    Indication: S/P Fontan Procedure, complex cyanotic heart defect  Goal INR: 1.5 - 2  INR Result: 2.3    20 year old male returns to the [facility] for a follow-up visit after four weeks.      Assessment  (-) bleeding, bruising or thromboembolic complications  (-) missed/extra warfarin doses  (-) medication changes  (-) dietary changes  (-) alcohol changes  (-) smoking changes  (-) activity level changes  (-) fluid status changes  (-) hospital visits, ER visits, interruptions in therapy  (-) illness/infection, injuries, or falls    Plan   -Pt's INR=2.3 today, which is above the desired therapeutic range of 1.5 - 2.  -Pt's INR has increased from 1.7 at the last visit.    -Pt has been taking warfarin 27.5mg weekly for the past 5mo.  -Plan to have the pt take warfarin 2.5mg less today, then continue with the current warfarin regimen.   -Pt to return to clinic in 3 weeks (10/10/22).     Ale Jha, PharmD  Clinical Pharmacist  [facility]  [phone]       
Her/She

## 2024-10-17 NOTE — H&P PST ADULT - SOURCE OF INFORMATION, PROFILE
All scripts/patient/chart(s)/physician office All scripts. medical evaluation note/patient/chart(s)/physician office

## 2024-10-17 NOTE — H&P PST ADULT - NSICDXPASTMEDICALHX_GEN_ALL_CORE_FT
PAST MEDICAL HISTORY:  Acquired hypothyroidism     Anemia     AVM (arteriovenous malformation) of colon     Gallbladder mass     Glaucoma     Hyperlipidemia     Hypertension     OAB (overactive bladder)      PAST MEDICAL HISTORY:  Acquired hypothyroidism     Anemia     Arthritis     AVM (arteriovenous malformation) of colon     Gallbladder mass     Glaucoma     Hyperlipidemia     Hypertension     OAB (overactive bladder)

## 2024-10-17 NOTE — H&P PST ADULT - HISTORY OF PRESENT ILLNESS
80 y/o female with Anemia, HTN, HLD OAB, Hypothyroidism, Glaucoma and other specified diseases of gallbladder presents to PST preop for robotic cholecystectomy and possible liver resection. Pt with h/o anemia and referred for colonoscopy which was negative. Workup imagining (CT &MRI) revealed gallbladder mass.   Pt had a colonoscopy in June which revealed an AVM which was cauterized.  80 y/o female with Anemia, HTN, HLD OAB, Hypothyroidism, Glaucoma and other specified diseases of gallbladder presents to PST preop for robotic cholecystectomy and possible liver resection. Pt with h/o anemia and referred for colonoscopy which was negative. Workup imagining (CT &MRI) revealed gallbladder mass.   Pt had a colonoscopy in June that showed an AVM which was cauterized.

## 2024-10-17 NOTE — H&P PST ADULT - ASSESSMENT
78 y/o female with Anemia, HTN, HLD OAB, Hypothyroidism, Glaucoma and other specified diseases of gallbladder presents to PST preop for robotic cholecystectomy and possible liver resection. Pt with h/o anemia and referred for colonoscopy which was negative. Workup imagining (CT &MRI) revealed gallbladder mass.   Pt had a colonoscopy in June which revealed an AVM which was cauterized.

## 2024-10-17 NOTE — H&P PST ADULT - PROBLEM SELECTOR PLAN 3
Postoperative Delirium Screen    Patient eligible for gerard risk screen age>75? yes    Health care proxy paperwork given to patient? Yes     Impaired mobility (ie: uses cane, walker, wheelchair, or assist device)? No    Known dementia diagnosis? No    Impaired functional status (METS<4)? No    Malnutrition BMI<20? No

## 2024-10-17 NOTE — H&P PST ADULT - CARDIOVASCULAR COMMENTS
+HTN, HLD on meds for management. Pt states a holter monitor was placed 1 week ago by her Cardiologist for  "spikes" on her EKG.  Monitor to be kept on for 2 weeks . holter monitor present +HTN, HLD on meds for management. Pt states a holter monitor was placed 1 week ago by her Cardiologist for frequent PAC's on EKG.  Monitor to be kept on for 2 weeks , will be taken off on  10/21

## 2024-10-18 PROBLEM — I10 ESSENTIAL (PRIMARY) HYPERTENSION: Chronic | Status: ACTIVE | Noted: 2024-10-14

## 2024-10-18 LAB
BASOPHILS # BLD AUTO: 0.02 K/UL — SIGNIFICANT CHANGE UP (ref 0–0.2)
BASOPHILS NFR BLD AUTO: 0.7 % — SIGNIFICANT CHANGE UP (ref 0–2)
EOSINOPHIL # BLD AUTO: 0.05 K/UL — SIGNIFICANT CHANGE UP (ref 0–0.5)
EOSINOPHIL NFR BLD AUTO: 1.7 % — SIGNIFICANT CHANGE UP (ref 0–6)
HCT VFR BLD CALC: 34 % — LOW (ref 34.5–45)
HGB BLD-MCNC: 9.7 G/DL — LOW (ref 11.5–15.5)
IMM GRANULOCYTES NFR BLD AUTO: 0.3 % — SIGNIFICANT CHANGE UP (ref 0–0.9)
LYMPHOCYTES # BLD AUTO: 1.27 K/UL — SIGNIFICANT CHANGE UP (ref 1–3.3)
LYMPHOCYTES # BLD AUTO: 42.3 % — SIGNIFICANT CHANGE UP (ref 13–44)
MCHC RBC-ENTMCNC: 23.5 PG — LOW (ref 27–34)
MCHC RBC-ENTMCNC: 28.5 GM/DL — LOW (ref 32–36)
MCV RBC AUTO: 82.3 FL — SIGNIFICANT CHANGE UP (ref 80–100)
MONOCYTES # BLD AUTO: 0.43 K/UL — SIGNIFICANT CHANGE UP (ref 0–0.9)
MONOCYTES NFR BLD AUTO: 14.3 % — HIGH (ref 2–14)
NEUTROPHILS # BLD AUTO: 1.22 K/UL — LOW (ref 1.8–7.4)
NEUTROPHILS NFR BLD AUTO: 40.7 % — LOW (ref 43–77)
PLATELET # BLD AUTO: 286 K/UL — SIGNIFICANT CHANGE UP (ref 150–400)
RBC # BLD: 4.13 M/UL — SIGNIFICANT CHANGE UP (ref 3.8–5.2)
RBC # FLD: SIGNIFICANT CHANGE UP (ref 10.3–14.5)
WBC # BLD: 3 K/UL — LOW (ref 3.8–10.5)
WBC # FLD AUTO: 3 K/UL — LOW (ref 3.8–10.5)

## 2024-10-21 RX ORDER — IRON DEXTRAN COMPLEX 100 MG/2ML
25 VIAL (ML) INJECTION
Qty: 1 | Refills: 0
Start: 2024-10-21

## 2024-10-21 RX ORDER — FERRIC DERISOMALTOSE 1000 MG/10ML
1000 SOLUTION INTRAVENOUS
Qty: 1 | Refills: 0
Start: 2024-10-21

## 2024-10-21 RX ORDER — IRON DEXTRAN COMPLEX 100 MG/2ML
975 VIAL (ML) INJECTION
Qty: 20 | Refills: 0
Start: 2024-10-21

## 2024-10-21 RX ORDER — IRON DEXTRAN COMPLEX 100 MG/2ML
975 VIAL (ML) INJECTION
Qty: 10 | Refills: 0
Start: 2024-10-21

## 2024-10-21 RX ORDER — IRON DEXTRAN COMPLEX 100 MG/2ML
975 VIAL (ML) INJECTION
Qty: 2 | Refills: 0
Start: 2024-10-21

## 2024-10-24 ENCOUNTER — APPOINTMENT (OUTPATIENT)
Dept: SURGICAL ONCOLOGY | Facility: HOSPITAL | Age: 79
End: 2024-10-24

## 2024-10-28 ENCOUNTER — APPOINTMENT (OUTPATIENT)
Dept: SURGICAL ONCOLOGY | Facility: HOSPITAL | Age: 79
End: 2024-10-28

## 2024-10-28 ENCOUNTER — INPATIENT (INPATIENT)
Facility: HOSPITAL | Age: 79
LOS: 2 days | Discharge: HOME | End: 2024-10-31
Attending: SURGERY | Admitting: SURGERY
Payer: MEDICARE

## 2024-10-28 ENCOUNTER — TRANSCRIPTION ENCOUNTER (OUTPATIENT)
Age: 79
End: 2024-10-28

## 2024-10-28 VITALS
HEIGHT: 67.5 IN | HEART RATE: 88 BPM | OXYGEN SATURATION: 98 % | DIASTOLIC BLOOD PRESSURE: 84 MMHG | WEIGHT: 186.07 LBS | RESPIRATION RATE: 20 BRPM | SYSTOLIC BLOOD PRESSURE: 133 MMHG

## 2024-10-28 DIAGNOSIS — E89.0 POSTPROCEDURAL HYPOTHYROIDISM: Chronic | ICD-10-CM

## 2024-10-28 DIAGNOSIS — Z98.890 OTHER SPECIFIED POSTPROCEDURAL STATES: Chronic | ICD-10-CM

## 2024-10-28 DIAGNOSIS — K82.8 OTHER SPECIFIED DISEASES OF GALLBLADDER: ICD-10-CM

## 2024-10-28 DIAGNOSIS — Z98.49 CATARACT EXTRACTION STATUS, UNSPECIFIED EYE: Chronic | ICD-10-CM

## 2024-10-28 LAB — GLUCOSE BLDC GLUCOMTR-MCNC: 109 MG/DL — HIGH (ref 70–99)

## 2024-10-28 PROCEDURE — 88305 TISSUE EXAM BY PATHOLOGIST: CPT | Mod: 26

## 2024-10-28 PROCEDURE — 49905 OMENTAL FLAP INTRA-ABDOM: CPT

## 2024-10-28 PROCEDURE — 38589 UNLISTED LAPS PX LYMPHTC SYS: CPT

## 2024-10-28 PROCEDURE — 47120 PARTIAL REMOVAL OF LIVER: CPT | Mod: AS

## 2024-10-28 PROCEDURE — 88307 TISSUE EXAM BY PATHOLOGIST: CPT | Mod: 26

## 2024-10-28 PROCEDURE — 47379 UNLISTED LAPS PX LIVER: CPT

## 2024-10-28 PROCEDURE — 88331 PATH CONSLTJ SURG 1 BLK 1SPC: CPT | Mod: 26

## 2024-10-28 PROCEDURE — 47562 LAPAROSCOPIC CHOLECYSTECTOMY: CPT

## 2024-10-28 PROCEDURE — 88304 TISSUE EXAM BY PATHOLOGIST: CPT | Mod: 26

## 2024-10-28 PROCEDURE — S2900 ROBOTIC SURGICAL SYSTEM: CPT | Mod: NC

## 2024-10-28 PROCEDURE — 88332 PATH CONSLTJ SURG EA ADD BLK: CPT | Mod: 26

## 2024-10-28 PROCEDURE — 76998 US GUIDE INTRAOP: CPT | Mod: 26

## 2024-10-28 DEVICE — SURGICEL 2 X 14": Type: IMPLANTABLE DEVICE | Status: FUNCTIONAL

## 2024-10-28 DEVICE — AGENT HEMOSTATIC HEMOBLAST 1.65G 10CM: Type: IMPLANTABLE DEVICE | Status: FUNCTIONAL

## 2024-10-28 DEVICE — LIGATING CLIPS WECK HEMOLOK POLYMER MEDIUM-LARGE (GREEN) 6: Type: IMPLANTABLE DEVICE | Status: FUNCTIONAL

## 2024-10-28 RX ORDER — HYDROMORPHONE HCL/0.9% NACL/PF 6 MG/30 ML
0.5 PATIENT CONTROLLED ANALGESIA SYRINGE INTRAVENOUS
Refills: 0 | Status: DISCONTINUED | OUTPATIENT
Start: 2024-10-28 | End: 2024-10-28

## 2024-10-28 RX ORDER — PIPERACILLIN AND TAZOBACTAM .5; 4 G/20ML; G/20ML
3.38 INJECTION, POWDER, LYOPHILIZED, FOR SOLUTION INTRAVENOUS ONCE
Refills: 0 | Status: COMPLETED | OUTPATIENT
Start: 2024-10-28 | End: 2024-10-28

## 2024-10-28 RX ORDER — MELATONIN 5 MG
3 TABLET ORAL AT BEDTIME
Refills: 0 | Status: DISCONTINUED | OUTPATIENT
Start: 2024-10-28 | End: 2024-10-31

## 2024-10-28 RX ORDER — ONDANSETRON HYDROCHLORIDE 2 MG/ML
4 INJECTION, SOLUTION INTRAMUSCULAR; INTRAVENOUS EVERY 8 HOURS
Refills: 0 | Status: DISCONTINUED | OUTPATIENT
Start: 2024-10-28 | End: 2024-10-31

## 2024-10-28 RX ORDER — OXYCODONE HYDROCHLORIDE 30 MG/1
5 TABLET ORAL EVERY 4 HOURS
Refills: 0 | Status: DISCONTINUED | OUTPATIENT
Start: 2024-10-28 | End: 2024-10-29

## 2024-10-28 RX ORDER — INFLUENZ VIR VAC TV P-SURF2003 15MCG/.5ML
0.5 SYRINGE (ML) INTRAMUSCULAR ONCE
Refills: 0 | Status: DISCONTINUED | OUTPATIENT
Start: 2024-10-28 | End: 2024-10-31

## 2024-10-28 RX ORDER — ACETAMINOPHEN 500 MG
650 TABLET ORAL EVERY 6 HOURS
Refills: 0 | Status: DISCONTINUED | OUTPATIENT
Start: 2024-10-28 | End: 2024-10-31

## 2024-10-28 RX ORDER — PIPERACILLIN AND TAZOBACTAM .5; 4 G/20ML; G/20ML
3.38 INJECTION, POWDER, LYOPHILIZED, FOR SOLUTION INTRAVENOUS ONCE
Refills: 0 | Status: COMPLETED | OUTPATIENT
Start: 2024-10-29 | End: 2024-10-29

## 2024-10-28 RX ORDER — MAGNESIUM, ALUMINUM HYDROXIDE 200-200 MG
30 TABLET,CHEWABLE ORAL EVERY 4 HOURS
Refills: 0 | Status: DISCONTINUED | OUTPATIENT
Start: 2024-10-28 | End: 2024-10-31

## 2024-10-28 RX ORDER — ONDANSETRON HYDROCHLORIDE 2 MG/ML
4 INJECTION, SOLUTION INTRAMUSCULAR; INTRAVENOUS ONCE
Refills: 0 | Status: DISCONTINUED | OUTPATIENT
Start: 2024-10-28 | End: 2024-10-28

## 2024-10-28 RX ORDER — PIPERACILLIN AND TAZOBACTAM .5; 4 G/20ML; G/20ML
3.38 INJECTION, POWDER, LYOPHILIZED, FOR SOLUTION INTRAVENOUS EVERY 8 HOURS
Refills: 0 | Status: DISCONTINUED | OUTPATIENT
Start: 2024-10-29 | End: 2024-10-29

## 2024-10-28 RX ADMIN — Medication 30 MILLILITER(S): at 18:30

## 2024-10-28 RX ADMIN — Medication 0.5 MILLIGRAM(S): at 18:48

## 2024-10-28 RX ADMIN — PIPERACILLIN AND TAZOBACTAM 200 GRAM(S): .5; 4 INJECTION, POWDER, LYOPHILIZED, FOR SOLUTION INTRAVENOUS at 18:30

## 2024-10-28 RX ADMIN — PIPERACILLIN AND TAZOBACTAM 25 GRAM(S): .5; 4 INJECTION, POWDER, LYOPHILIZED, FOR SOLUTION INTRAVENOUS at 22:49

## 2024-10-28 RX ADMIN — Medication 0.5 MILLIGRAM(S): at 18:36

## 2024-10-28 RX ADMIN — Medication 30 MILLILITER(S): at 22:10

## 2024-10-28 RX ADMIN — SODIUM CHLORIDE 3 MILLILITER(S): 9 INJECTION, SOLUTION INTRAMUSCULAR; INTRAVENOUS; SUBCUTANEOUS at 22:21

## 2024-10-28 NOTE — CHART NOTE - NSCHARTNOTEFT_GEN_A_CORE
SURGERY PROGRESS NOTE    Procedure:    Surgeon:     SUBJECTIVE: : 79y Female  s/p  cholecystectomy and partial hepatectomy     Patient examined bedside. MANASA since OR. Patient recovering uneventfully. Vitals WNR. Afebrile. Pain score 3/10. Patient denies fever, chills, nausea, vomiting.     Objective:  Gen: NAD, AAOx3  Pulm: No work on breathing  Card: RRR  abdomen: soft, non-distended. tenderness consistent with post operative course. incisions in tact, dry. dressings in place without strikethrough.  Neftaly drain with SS output.     Vital Signs Last 24 Hrs  T(C): 36.9 (28 Oct 2024 22:13), Max: 37 (28 Oct 2024 21:00)  T(F): 98.5 (28 Oct 2024 22:13), Max: 98.6 (28 Oct 2024 21:00)  HR: 90 (28 Oct 2024 22:13) (83 - 98)  BP: 135/78 (28 Oct 2024 22:13) (120/73 - 140/81)  BP(mean): 91 (28 Oct 2024 21:00) (86 - 97)  RR: 18 (28 Oct 2024 22:13) (12 - 20)  SpO2: 100% (28 Oct 2024 22:13) (93% - 100%)    Parameters below as of 28 Oct 2024 22:13  Patient On (Oxygen Delivery Method): room air      I&O's Summary    28 Oct 2024 07:01  -  28 Oct 2024 22:35  --------------------------------------------------------  IN: 470 mL / OUT: 505 mL / NET: -35 mL      I&O's Detail    28 Oct 2024 07:01  -  28 Oct 2024 22:35  --------------------------------------------------------  IN:    Lactated Ringers: 150 mL    Oral Fluid: 320 mL  Total IN: 470 mL    OUT:    Bulb (mL): 15 mL    Indwelling Catheter - Urethral (mL): 490 mL  Total OUT: 505 mL    Total NET: -35 mL            LABS:                  A/P:     79y Female  s/p cholecystectomy and partial hepatectomy.     - MANASA since OR, recovering well from procedure post operatively.   - C/w Pain control   - Monitor i/o's + Vitals SURGERY PROGRESS NOTE    Procedure:  cholecystectomy and partial hepatectomy   Surgeon: Dr. Talamantes    SUBJECTIVE: : 79y Female  s/p  cholecystectomy and partial hepatectomy     Patient examined bedside. MANASA since OR. Patient recovering uneventfully. Vitals WNR. Afebrile. Pain score 3/10. Patient denies fever, chills, nausea, vomiting.     Objective:  Gen: NAD, AAOx3  Pulm: No work on breathing  Card: RRR  abdomen: soft, non-distended. tenderness consistent with post operative course. incisions in tact, dry. dressings in place without strikethrough.  Neftaly drain with SS output.     Vital Signs Last 24 Hrs  T(C): 36.9 (28 Oct 2024 22:13), Max: 37 (28 Oct 2024 21:00)  T(F): 98.5 (28 Oct 2024 22:13), Max: 98.6 (28 Oct 2024 21:00)  HR: 90 (28 Oct 2024 22:13) (83 - 98)  BP: 135/78 (28 Oct 2024 22:13) (120/73 - 140/81)  BP(mean): 91 (28 Oct 2024 21:00) (86 - 97)  RR: 18 (28 Oct 2024 22:13) (12 - 20)  SpO2: 100% (28 Oct 2024 22:13) (93% - 100%)    Parameters below as of 28 Oct 2024 22:13  Patient On (Oxygen Delivery Method): room air      I&O's Summary    28 Oct 2024 07:01  -  28 Oct 2024 22:35  --------------------------------------------------------  IN: 470 mL / OUT: 505 mL / NET: -35 mL      I&O's Detail    28 Oct 2024 07:01  -  28 Oct 2024 22:35  --------------------------------------------------------  IN:    Lactated Ringers: 150 mL    Oral Fluid: 320 mL  Total IN: 470 mL    OUT:    Bulb (mL): 15 mL    Indwelling Catheter - Urethral (mL): 490 mL  Total OUT: 505 mL    Total NET: -35 mL            LABS:                  A/P:     79y Female  s/p cholecystectomy and partial hepatectomy.     - MANASA since OR, recovering well from procedure post operatively.   - C/w Pain control   - Monitor i/o's + Vitals

## 2024-10-28 NOTE — PATIENT PROFILE ADULT - FALL HARM RISK - HARM RISK INTERVENTIONS

## 2024-10-28 NOTE — BRIEF OPERATIVE NOTE - NSICDXBRIEFPROCEDURE_GEN_ALL_CORE_FT
PROCEDURES:  Cholecystectomy 28-Oct-2024 18:16:30  Amanda Alcantar  Partial hepatectomy 28-Oct-2024 18:16:37  Amanda Alcantar

## 2024-10-28 NOTE — BRIEF OPERATIVE NOTE - COMMENTS
I, Jena Estrada PA-C, served as the first assistant in this operation. I assisted in placing ports, docking, and targeting the da Jolynn robot, first assisted at the surgical field while the surgeon was performing the operation at the robotic console by providing instrument exchanges, tissue retraction, suction and irrigation, specimen retrieval, passing and removing sutures and sponges, undocking the robotic platform, and closed surgical wounds.     Please match surgeon's billing codes

## 2024-10-28 NOTE — PATIENT PROFILE ADULT - FUNCTIONAL ASSESSMENT - BASIC MOBILITY 2.
simvastatin     Last Written Prescription Date: 2/9/17  Last Fill Quantity: 30, # refills: 0  Last Office Visit with [facility] or [facility] prescribing provider: 2/9/17  Last date of pharmacy refill:  2/23/17       Lab Results   Component Value Date    CHOL 131 02/09/2017     Lab Results   Component Value Date    HDL 45 02/09/2017     Lab Results   Component Value Date    LDL 71 02/09/2017     Lab Results   Component Value Date    TRIG 77 02/09/2017     Lab Results   Component Value Date    CHOLHDLRATIO 2.8 05/13/2015        3 = A little assistance

## 2024-10-28 NOTE — PATIENT PROFILE ADULT - NSPROMEDSBROUGHTTOHOSP_GEN_A_NUR
Reason for Visit: Patient is a 16y old  Female who presents with a chief complaint of numbness (28 May 2021 00:34)    Interval History/ROS: Sister with patient at bedside who states that pt had 7 episodes of right shoulder paresthesias moving down arm & leg; with right arm flexion and internal rotation since 8PM. Last only a few seconds. Otherwise, doing well.     HEADSS exam:  Pt lives at home with parents, 19yo sister and twin brother. She feels safe at home and feels like she has people she can talk to. Pt is in 11th grade going to school [employer] - some days in school and some days remote. She thinks she might want to be a zoologist. She has friends at school and has not experienced bullying. She does not smoke, use marijuana or vape. She does not drink alcohol. She is not sexually active. She has not been in a car with an impaired . She is not depressed or anxious and does not have any SI/DHI.    MEDICATIONS  (STANDING):    MEDICATIONS  (PRN):  LORazepam IntraMuscular Injection - Peds 2.2 milliGRAM(s) IntraMuscular every 10 minutes PRN seizures >3min    Allergies    No Known Allergies    Intolerances        Vital Signs Last 24 Hrs  T(C): 36.5 (28 May 2021 05:36), Max: 37.1 (27 May 2021 14:11)  T(F): 97.7 (28 May 2021 05:36), Max: 98.7 (27 May 2021 14:11)  HR: 80 (28 May 2021 05:36) (75 - 91)  BP: 99/64 (28 May 2021 05:36) (99/64 - 115/63)  BP(mean): --  RR: 18 (28 May 2021 05:36) (16 - 18)  SpO2: 99% (28 May 2021 05:36) (99% - 100%)  Daily Height/Length in cm: 156 (28 May 2021 00:00)    Daily     GENERAL PHYSICAL EXAM  General:        Well nourished, well appearing female sitting in bed in no acute distress  HEENT:        Head wrapped in EEG, clear conjunctiva, external ear normal, nasal mucosa normal, oral pharynx clear  Neck:            Supple, full range of motion, no nuchal rigidity  CV:               Regular rate and rhythm, no murmurs. Warm and well perfused.  Respiratory:   Clear to auscultation; Even, nonlabored breathing  Abdominal:    Soft, nontender, nondistended, no masses, no organomegaly  Extremities:    No joint swelling, erythema, tenderness; normal ROM, no contractures  Skin:              No rash, no neurocutaneous stigmata    Head circumference:      NEUROLOGIC EXAM  Mental Status:     Oriented to person, place, and date; Good eye contact; follows simple commands  Cranial Nerves:    PERRL, EOMI, no facial asymmetry, V1-V3 intact , symmetric palate, tongue midline.   Visual Fields:        Full visual field  Muscle Strength:  Full strength 5/5, proximal and distal,  upper and lower extremities  Muscle Tone:       Normal tone  DTR:                    2+/4 Biceps, Brachioradialis;  2+/4  Patellar, Ankle bilateral. No clonus.  Babinski:              Plantar reflexes flexion bilaterally  Sensation:            Intact to light touch throughout.  Coordination:       No dysmetria in finger to nose test bilaterally  Gait:                    Normal gait, normal toe walking, normal heel walking  Romberg:            Negative Romberg        Lab Results:    EEG Results:    Imaging Studies:   Reason for Visit: Patient is a 16y old  Female who presents with a chief complaint of numbness (28 May 2021 00:34)    Interval History/ROS: Sister with patient at bedside who states that pt had 7 episodes of right shoulder paresthesias moving down arm & leg; with right arm flexion and internal rotation since 8PM. Last only a few seconds. Otherwise, doing well.     HEADSS exam:  Pt lives at home with parents, 19yo sister and twin brother. She feels safe at home and feels like she has people she can talk to. Pt is in 11th grade going to school [employer] - some days in school and some days remote. She thinks she might want to be a zoologist. She has friends at school and has not experienced bullying. She does not smoke, use marijuana or vape. She does not drink alcohol. She is not sexually active. She has not been in a car with an impaired . She is not depressed or anxious and does not have any SI/DHI.    MEDICATIONS  (STANDING):    MEDICATIONS  (PRN):  LORazepam IntraMuscular Injection - Peds 2.2 milliGRAM(s) IntraMuscular every 10 minutes PRN seizures >3min    Allergies    No Known Allergies    Intolerances        Vital Signs Last 24 Hrs  T(C): 36.5 (28 May 2021 05:36), Max: 37.1 (27 May 2021 14:11)  T(F): 97.7 (28 May 2021 05:36), Max: 98.7 (27 May 2021 14:11)  HR: 80 (28 May 2021 05:36) (75 - 91)  BP: 99/64 (28 May 2021 05:36) (99/64 - 115/63)  BP(mean): --  RR: 18 (28 May 2021 05:36) (16 - 18)  SpO2: 99% (28 May 2021 05:36) (99% - 100%)  Daily Height/Length in cm: 156 (28 May 2021 00:00)    Daily     GENERAL PHYSICAL EXAM  General:        Well nourished, well appearing female sitting in bed in no acute distress  HEENT:        Head wrapped in EEG, clear conjunctiva, external ear normal, nasal mucosa normal, oral pharynx clear  Neck:            Supple, full range of motion, no nuchal rigidity  CV:               Regular rate and rhythm, no murmurs. Warm and well perfused.  Respiratory:   Clear to auscultation; Even, nonlabored breathing  Abdominal:    Soft, nontender, nondistended, no masses, no organomegaly  Extremities:    No joint swelling, erythema, tenderness; normal ROM, no contractures  Skin:              No rash, no neurocutaneous stigmata      NEUROLOGIC EXAM:  - Mental Status:  Awake, alert, cooperative; Speech is fluent and content appropriate.   - CN: PERRL and 3mm b/l; EOMI, no nystagmus; sensation intact V1-V3; face symmetric to eye closure and smile; hearing intact to finger rub; tongue protrudes in midline; shoulder shrug intact   - Motor: Strength is 5/5 in proximal and distal extremities x4. Normal muscle bulk and tone throughout.  - Reflexes:  2+ and symmetric at the biceps, triceps, brachioradialis, knees, and ankles.  Plantar responses flexor b/l.   - Sensory:  Intact throughout to light touch.  - Coordination:  Finger to nose intact without dysmetria.    - Gait: Normal stride and arm swing.         Lab Results:    EEG Results:    Imaging Studies:   no

## 2024-10-29 ENCOUNTER — TRANSCRIPTION ENCOUNTER (OUTPATIENT)
Age: 79
End: 2024-10-29

## 2024-10-29 LAB
ADD ON TEST-SPECIMEN IN LAB: SIGNIFICANT CHANGE UP
ALBUMIN SERPL ELPH-MCNC: 3.4 G/DL — SIGNIFICANT CHANGE UP (ref 3.3–5)
ALP SERPL-CCNC: 50 U/L — SIGNIFICANT CHANGE UP (ref 40–120)
ALT FLD-CCNC: 92 U/L — HIGH (ref 4–33)
ANION GAP SERPL CALC-SCNC: 10 MMOL/L — SIGNIFICANT CHANGE UP (ref 7–14)
AST SERPL-CCNC: 130 U/L — HIGH (ref 4–32)
BILIRUB SERPL-MCNC: <0.2 MG/DL — SIGNIFICANT CHANGE UP (ref 0.2–1.2)
BUN SERPL-MCNC: 12 MG/DL — SIGNIFICANT CHANGE UP (ref 7–23)
CALCIUM SERPL-MCNC: 7.9 MG/DL — LOW (ref 8.4–10.5)
CHLORIDE SERPL-SCNC: 103 MMOL/L — SIGNIFICANT CHANGE UP (ref 98–107)
CO2 SERPL-SCNC: 24 MMOL/L — SIGNIFICANT CHANGE UP (ref 22–31)
CREAT SERPL-MCNC: 0.89 MG/DL — SIGNIFICANT CHANGE UP (ref 0.5–1.3)
EGFR: 66 ML/MIN/1.73M2 — SIGNIFICANT CHANGE UP
GLUCOSE SERPL-MCNC: 125 MG/DL — HIGH (ref 70–99)
HCT VFR BLD CALC: 32.2 % — LOW (ref 34.5–45)
HGB BLD-MCNC: 9.7 G/DL — LOW (ref 11.5–15.5)
MAGNESIUM SERPL-MCNC: 2.1 MG/DL — SIGNIFICANT CHANGE UP (ref 1.6–2.6)
MCHC RBC-ENTMCNC: 25 PG — LOW (ref 27–34)
MCHC RBC-ENTMCNC: 30.1 GM/DL — LOW (ref 32–36)
MCV RBC AUTO: 83 FL — SIGNIFICANT CHANGE UP (ref 80–100)
NRBC # BLD: 0 /100 WBCS — SIGNIFICANT CHANGE UP (ref 0–0)
NRBC # FLD: 0 K/UL — SIGNIFICANT CHANGE UP (ref 0–0)
PHOSPHATE SERPL-MCNC: 4 MG/DL — SIGNIFICANT CHANGE UP (ref 2.5–4.5)
PLATELET # BLD AUTO: 231 K/UL — SIGNIFICANT CHANGE UP (ref 150–400)
POTASSIUM SERPL-MCNC: 4.2 MMOL/L — SIGNIFICANT CHANGE UP (ref 3.5–5.3)
POTASSIUM SERPL-SCNC: 4.2 MMOL/L — SIGNIFICANT CHANGE UP (ref 3.5–5.3)
PROT SERPL-MCNC: 6 G/DL — SIGNIFICANT CHANGE UP (ref 6–8.3)
RBC # BLD: 3.88 M/UL — SIGNIFICANT CHANGE UP (ref 3.8–5.2)
RBC # FLD: 26.3 % — HIGH (ref 10.3–14.5)
SODIUM SERPL-SCNC: 137 MMOL/L — SIGNIFICANT CHANGE UP (ref 135–145)
WBC # BLD: 9.16 K/UL — SIGNIFICANT CHANGE UP (ref 3.8–10.5)
WBC # FLD AUTO: 9.16 K/UL — SIGNIFICANT CHANGE UP (ref 3.8–10.5)

## 2024-10-29 RX ORDER — IBUPROFEN 200 MG
1 TABLET ORAL
Qty: 0 | Refills: 0 | DISCHARGE
Start: 2024-10-29

## 2024-10-29 RX ORDER — ACETAMINOPHEN 500 MG
2 TABLET ORAL
Qty: 0 | Refills: 0 | DISCHARGE
Start: 2024-10-29

## 2024-10-29 RX ORDER — CYCLOBENZAPRINE HYDROCHLORIDE 30 MG/1
5 CAPSULE, EXTENDED RELEASE ORAL THREE TIMES A DAY
Refills: 0 | Status: DISCONTINUED | OUTPATIENT
Start: 2024-10-29 | End: 2024-10-31

## 2024-10-29 RX ORDER — ROSUVASTATIN CALCIUM 10 MG
20 TABLET ORAL AT BEDTIME
Refills: 0 | Status: DISCONTINUED | OUTPATIENT
Start: 2024-10-29 | End: 2024-10-29

## 2024-10-29 RX ORDER — CYCLOBENZAPRINE HYDROCHLORIDE 30 MG/1
1 CAPSULE, EXTENDED RELEASE ORAL
Qty: 0 | Refills: 0 | DISCHARGE
Start: 2024-10-29

## 2024-10-29 RX ORDER — ENOXAPARIN SODIUM 40MG/0.4ML
40 SYRINGE (ML) SUBCUTANEOUS EVERY 24 HOURS
Refills: 0 | Status: DISCONTINUED | OUTPATIENT
Start: 2024-10-29 | End: 2024-10-31

## 2024-10-29 RX ORDER — LISINOPRIL 40 MG
40 TABLET ORAL DAILY
Refills: 0 | Status: DISCONTINUED | OUTPATIENT
Start: 2024-10-29 | End: 2024-10-29

## 2024-10-29 RX ORDER — IBUPROFEN 200 MG
400 TABLET ORAL EVERY 6 HOURS
Refills: 0 | Status: DISCONTINUED | OUTPATIENT
Start: 2024-10-29 | End: 2024-10-31

## 2024-10-29 RX ADMIN — OXYCODONE HYDROCHLORIDE 5 MILLIGRAM(S): 30 TABLET ORAL at 04:57

## 2024-10-29 RX ADMIN — SODIUM CHLORIDE 3 MILLILITER(S): 9 INJECTION, SOLUTION INTRAMUSCULAR; INTRAVENOUS; SUBCUTANEOUS at 21:45

## 2024-10-29 RX ADMIN — Medication 400 MILLIGRAM(S): at 23:33

## 2024-10-29 RX ADMIN — Medication 650 MILLIGRAM(S): at 10:28

## 2024-10-29 RX ADMIN — Medication 40 MILLIGRAM(S): at 11:50

## 2024-10-29 RX ADMIN — Medication 650 MILLIGRAM(S): at 04:14

## 2024-10-29 RX ADMIN — Medication 650 MILLIGRAM(S): at 10:58

## 2024-10-29 RX ADMIN — Medication 400 MILLIGRAM(S): at 11:28

## 2024-10-29 RX ADMIN — PIPERACILLIN AND TAZOBACTAM 25 GRAM(S): .5; 4 INJECTION, POWDER, LYOPHILIZED, FOR SOLUTION INTRAVENOUS at 11:29

## 2024-10-29 RX ADMIN — CYCLOBENZAPRINE HYDROCHLORIDE 5 MILLIGRAM(S): 30 CAPSULE, EXTENDED RELEASE ORAL at 10:28

## 2024-10-29 RX ADMIN — CYCLOBENZAPRINE HYDROCHLORIDE 5 MILLIGRAM(S): 30 CAPSULE, EXTENDED RELEASE ORAL at 23:02

## 2024-10-29 RX ADMIN — SODIUM CHLORIDE 3 MILLILITER(S): 9 INJECTION, SOLUTION INTRAMUSCULAR; INTRAVENOUS; SUBCUTANEOUS at 12:05

## 2024-10-29 RX ADMIN — Medication 650 MILLIGRAM(S): at 04:44

## 2024-10-29 RX ADMIN — Medication 400 MILLIGRAM(S): at 17:56

## 2024-10-29 RX ADMIN — Medication 400 MILLIGRAM(S): at 13:08

## 2024-10-29 RX ADMIN — SODIUM CHLORIDE 3 MILLILITER(S): 9 INJECTION, SOLUTION INTRAMUSCULAR; INTRAVENOUS; SUBCUTANEOUS at 05:28

## 2024-10-29 RX ADMIN — OXYCODONE HYDROCHLORIDE 5 MILLIGRAM(S): 30 TABLET ORAL at 04:27

## 2024-10-29 RX ADMIN — Medication 400 MILLIGRAM(S): at 17:26

## 2024-10-29 RX ADMIN — Medication 400 MILLIGRAM(S): at 23:03

## 2024-10-29 RX ADMIN — PIPERACILLIN AND TAZOBACTAM 25 GRAM(S): .5; 4 INJECTION, POWDER, LYOPHILIZED, FOR SOLUTION INTRAVENOUS at 05:18

## 2024-10-29 NOTE — DISCHARGE NOTE PROVIDER - NSDCCPTREATMENT_GEN_ALL_CORE_FT
PRINCIPAL PROCEDURE  Procedure: Cholecystectomy  Findings and Treatment:       SECONDARY PROCEDURE  Procedure: Partial hepatectomy  Findings and Treatment:

## 2024-10-29 NOTE — DISCHARGE NOTE PROVIDER - NSDCHC_MEDRECSTATUS_GEN_ALL_CORE
Patient states he needs a refill on the amphetamine-dextroamphetamine (ADDERALL) 20 MG tablet, sent to [facility] pharmacy in [place] on [address]. Patient states this prescription has been sent to two other pharmacies but they are out of stock except for the [facility] in [place]. Patient states he will be going out of [facility] and asks that it be filled asap.    Admission Reconciliation is Completed  Discharge Reconciliation is Not Complete Admission Reconciliation is Completed  Discharge Reconciliation is Completed

## 2024-10-29 NOTE — PROGRESS NOTE ADULT - SUBJECTIVE AND OBJECTIVE BOX
INTERVAL EVENTS: No acute events overnight.  SUBJECTIVE: Patient seen and examined at bedside with surgical team, patient without complaints. Denies fever, chills, CP, SOB nausea, vomiting, abdominal pain.    OBJECTIVE:    Vital Signs Last 24 Hrs  T(C): 37.1 (29 Oct 2024 08:09), Max: 37.1 (29 Oct 2024 08:09)  T(F): 98.7 (29 Oct 2024 08:09), Max: 98.7 (29 Oct 2024 08:09)  HR: 83 (29 Oct 2024 08:09) (83 - 98)  BP: 129/62 (29 Oct 2024 08:09) (120/73 - 140/81)  BP(mean): 91 (28 Oct 2024 21:00) (86 - 97)  RR: 16 (29 Oct 2024 08:09) (12 - 20)  SpO2: 100% (29 Oct 2024 08:09) (93% - 100%)    Parameters below as of 29 Oct 2024 08:09  Patient On (Oxygen Delivery Method): room air    I&O's Detail    28 Oct 2024 07:01  -  29 Oct 2024 07:00  --------------------------------------------------------  IN:    Lactated Ringers: 300 mL    Oral Fluid: 720 mL  Total IN: 1020 mL    OUT:    Bulb (mL): 55 mL    Indwelling Catheter - Urethral (mL): 890 mL  Total OUT: 945 mL    Total NET: 75 mL      29 Oct 2024 07:01  -  29 Oct 2024 09:52  --------------------------------------------------------  IN:    Lactated Ringers: 30 mL    Oral Fluid: 240 mL  Total IN: 270 mL    OUT:    Bulb (mL): 20 mL    Indwelling Catheter - Urethral (mL): 425 mL  Total OUT: 445 mL    Total NET: -175 mL      MEDICATIONS  (STANDING):  enoxaparin Injectable 40 milliGRAM(s) SubCutaneous every 24 hours  ibuprofen  Tablet. 400 milliGRAM(s) Oral every 6 hours  influenza  Vaccine (HIGH DOSE) 0.5 milliLiter(s) IntraMuscular once  piperacillin/tazobactam IVPB.. 3.375 Gram(s) IV Intermittent every 8 hours  sodium chloride 0.9% lock flush 3 milliLiter(s) IV Push every 8 hours    MEDICATIONS  (PRN):  acetaminophen     Tablet .. 650 milliGRAM(s) Oral every 6 hours PRN Temp greater or equal to 38C (100.4F), Mild Pain (1 - 3)  aluminum hydroxide/magnesium hydroxide/simethicone Suspension 30 milliLiter(s) Oral every 4 hours PRN Dyspepsia  cyclobenzaprine 5 milliGRAM(s) Oral three times a day PRN Abdominal pain  melatonin 3 milliGRAM(s) Oral at bedtime PRN Insomnia  ondansetron Injectable 4 milliGRAM(s) IV Push every 8 hours PRN Nausea and/or Vomiting  oxyCODONE    IR 5 milliGRAM(s) Oral every 4 hours PRN Severe Pain (7 - 10)      PHYSICAL EXAM:  Constitutional: A&Ox3, NAD  Respiratory: Unlabored breathing  Abdomen: Soft, nondistended, NTTP. No rebound or guarding. ZEYNEP w/ SS output  Extremities: WWP, MENDEZ spontaneously    LABS:                        9.7    9.16  )-----------( 231      ( 29 Oct 2024 06:24 )             32.2     10-29    137  |  103  |  12  ----------------------------<  125[H]  4.2   |  24  |  0.89    Ca    7.9[L]      29 Oct 2024 06:24  Phos  4.0     10-29  Mg     2.10     10-29    TPro  6.0  /  Alb  3.4  /  TBili  <0.2  /  DBili  x   /  AST  130[H]  /  ALT  92[H]  /  AlkPhos  50  10-29      LIVER FUNCTIONS - ( 29 Oct 2024 06:24 )  Alb: 3.4 g/dL / Pro: 6.0 g/dL / ALK PHOS: 50 U/L / ALT: 92 U/L / AST: 130 U/L / GGT: x           Urinalysis Basic - ( 29 Oct 2024 06:24 )    Color: x / Appearance: x / SG: x / pH: x  Gluc: 125 mg/dL / Ketone: x  / Bili: x / Urobili: x   Blood: x / Protein: x / Nitrite: x   Leuk Esterase: x / RBC: x / WBC x   Sq Epi: x / Non Sq Epi: x / Bacteria: x

## 2024-10-29 NOTE — PROGRESS NOTE ADULT - ASSESSMENT
79y Female  s/p cholecystectomy and partial hepatectomy.     - Zosyn   - C/w Pain control   - Regular diet  - DC drain   - Discharge today or tomorrow

## 2024-10-29 NOTE — PHYSICAL THERAPY INITIAL EVALUATION ADULT - ASSISTIVE DEVICE FOR TRANSFER: GAIT, REHAB EVAL
10 feet without assistive device; pt unsteady; increased stability and gait speed with rolling walker.

## 2024-10-29 NOTE — DISCHARGE NOTE PROVIDER - CARE PROVIDER_API CALL
Vinita Avendano, [facility]  Surgery  [address], Division [place] [address]  Phone: [phone]  Fax: [phone]  Follow Up Time: 1 week

## 2024-10-29 NOTE — DISCHARGE NOTE PROVIDER - NSDCMRMEDTOKEN_GEN_ALL_CORE_FT
acetaminophen 325 mg oral tablet: 2 tab(s) orally every 6 hours As needed Temp greater or equal to 38C (100.4F), Mild Pain (1 - 3)  cyclobenzaprine 5 mg oral tablet: 1 tab(s) orally 3 times a day As needed Abdominal pain  ferrous sulfate: 65 milligram(s) orally 2 times a day  folic acid 1 mg oral tablet: 1 tab(s) orally once a day (at bedtime)  Gemtesa 75 mg oral tablet: 1 tab(s) orally once a day (at bedtime)  ibuprofen 400 mg oral tablet: 1 tab(s) orally every 6 hours  latanoprost 0.005% ophthalmic emulsion: 1 drop(s) in each affected eye once a day (at bedtime)  ramipril 10 mg oral capsule: 1 cap(s) orally once a day (at bedtime)  rosuvastatin 20 mg oral tablet: 1 tab(s) orally once a day (at bedtime)  Synthroid 137 mcg (0.137 mg) oral tablet: 1 tab(s) orally once a day (at bedtime)   acetaminophen 325 mg oral tablet: 2 tab(s) orally every 6 hours As needed Temp greater or equal to 38C (100.4F), Mild Pain (1 - 3)  cyclobenzaprine 5 mg oral tablet: 1 tab(s) orally 3 times a day As needed Abdominal pain  cyclobenzaprine 5 mg oral tablet: 1 tab(s) orally 3 times a day as needed for Abdominal pain MDD: 3  Eliquis 2.5 mg oral tablet: 1 tab(s) orally 2 times a day MDD: 2  ferrous sulfate: 65 milligram(s) orally 2 times a day  folic acid 1 mg oral tablet: 1 tab(s) orally once a day (at bedtime)  Gemtesa 75 mg oral tablet: 1 tab(s) orally once a day (at bedtime)  ibuprofen 400 mg oral tablet: 1 tab(s) orally every 6 hours  K-Phos Neutral oral tablet: 1 tab(s) orally 2 times a day MDD: 2  latanoprost 0.005% ophthalmic emulsion: 1 drop(s) in each affected eye once a day (at bedtime)  lidocaine 4% topical film: Apply topically to affected area once a day MDD: 1  ramipril 10 mg oral capsule: 1 cap(s) orally once a day (at bedtime)  rosuvastatin 20 mg oral tablet: 1 tab(s) orally once a day (at bedtime)  Synthroid 137 mcg (0.137 mg) oral tablet: 1 tab(s) orally once a day (at bedtime)

## 2024-10-29 NOTE — PHYSICAL THERAPY INITIAL EVALUATION ADULT - ACTIVE RANGE OF MOTION EXAMINATION, REHAB EVAL
lizeth. upper extremity Active ROM was WNL (within normal limits)/bilateral lower extremity Active ROM was WNL (within normal limits)

## 2024-10-29 NOTE — DISCHARGE NOTE PROVIDER - HOSPITAL COURSE
80 y/o female with Anemia, HTN, HLD OAB, Hypothyroidism, Glaucoma and other specified diseases of gallbladder. Pt with h/o anemia and referred for colonoscopy which was negative. Workup imagining (CT &MRI) revealed gallbladder mass. Patient is s/p robotic cholecystectomy and partial hepatectomy on 10/28.     At the time of discharge, the patient was hemodynamically stable, was tolerating PO diet, was voiding urine and passing stool.  Patient was ambulating and was comfortable with adequate pain control.  The patient was instructed to follow up with Dr. Talamantes within 1 week after discharge from the hospital.  The patient / family felt comfortable with discharge.  The patient had no other issues.    Per attending, patient deemed medically stable and ready for discharge at this time.       78 y/o female with Anemia, HTN, HLD OAB, Hypothyroidism, Glaucoma and other specified diseases of gallbladder. Pt with h/o anemia and referred for colonoscopy which was negative. Workup imagining (CT &MRI) revealed gallbladder mass. Patient is s/p robotic cholecystectomy and partial hepatectomy on 10/28. Patient tolerated operation well and there were no post-operative complications identified. Patient remained hemodynamically stable in the PACU and transferred to the surgical floor. Diet was restarted and advanced as tolerated. ZEYNEP drain in right abdomen removed prior to discharge.  Patient seen by physical therapy throughout hospital stay who recommended patient be discharged to home with no skilled PT needs.  At the time of discharge, the patient was hemodynamically stable, was tolerating PO diet, was voiding urine and passing stool.  Patient was ambulating and was comfortable with adequate pain control.  The patient was instructed to follow up with Dr. Talamantes within 1 week after discharge from the hospital.  The patient / family felt comfortable with discharge.  The patient had no other issues.    Per attending, patient deemed medically stable and ready for discharge at this time.

## 2024-10-29 NOTE — DISCHARGE NOTE PROVIDER - NSDCCPCAREPLAN_GEN_ALL_CORE_FT
PRINCIPAL DISCHARGE DIAGNOSIS  Diagnosis: Other specified diseases of gallbladder  Assessment and Plan of Treatment: WOUND CARE:  Please keep incisions clean and dry. Please do not Scrub or rub incisions. Do not use lotion or powder on incisions.   BATHING: You may shower and/or sponge bathe. You may use warm soapy water in the shower and rinse, pat dry.  ACTIVITY: No heavy lifting or straining. Otherwise, you may return to your usual level of physical activity. If you are taking narcotic pain medication DO NOT drive a car, operate machinery or make important decisions.  PAIN: You may take over-the-counter medications for pain. You make take Tylenol orally every 6 hours as needed. Do not exceed 4,000mg a day. You may take ibuprofen every 6 hours. You may alternate between the two for better pain control (every 3 hours). You have been prescribed narcotics for pain management. Please take as prescribed on pill bottle, AS NEEDED, for BREAKTHROUGH pain ONLY. If you are taking narcotic pain medication DO NOT drive a car, operate machinery or make important decisions.  DIET: Return to your usual diet.  NOTIFY YOUR SURGEON IF YOU HAVE: any bleeding that does not stop, any pus draining from your wound(s), any fever (over 100.4 F) persistent nausea/vomiting, or if your pain is not controlled on your discharge pain medications, unable to urinate.  FOLLOW UP:  1. Please follow up with your primary care physician in one week regarding your hospitalization, bring copies of your discharge paperwork.  2. Please follow up with your surgeon, Dr. Talamantes       PRINCIPAL DISCHARGE DIAGNOSIS  Diagnosis: Other specified diseases of gallbladder  Assessment and Plan of Treatment: WOUND CARE:  Please keep incisions clean and dry. Please do not Scrub or rub incisions. Do not use lotion or powder on incisions.   BATHING: You may shower and/or sponge bathe. You may use warm soapy water in the shower and rinse, pat dry.  ACTIVITY: No heavy lifting or straining. Otherwise, you may return to your usual level of physical activity. If you are taking narcotic pain medication DO NOT drive a car, operate machinery or make important decisions.  PAIN: You may take over-the-counter medications for pain. You make take Tylenol orally every 6 hours as needed. Do not exceed 4,000mg a day. You may take ibuprofen every 6 hours. You may alternate between the two for better pain control (every 3 hours). You have been prescribed narcotics for pain management. Please take as prescribed on pill bottle, AS NEEDED, for BREAKTHROUGH pain ONLY. If you are taking narcotic pain medication DO NOT drive a car, operate machinery or make important decisions.  DIET: Return to your usual diet.  NOTIFY YOUR SURGEON IF YOU HAVE: any bleeding that does not stop, any pus draining from your wound(s), any fever (over 100.4 F) persistent nausea/vomiting, or if your pain is not controlled on your discharge pain medications, unable to urinate.  FOLLOW UP:  1. Please follow up with your primary care physician in one week regarding your hospitalization, bring copies of your discharge paperwork.  2. Please follow up with your surgeon, Dr. Talamantes. Call [phone] to make an appointment.        PRINCIPAL DISCHARGE DIAGNOSIS  Diagnosis: Other specified diseases of gallbladder  Assessment and Plan of Treatment: WOUND CARE:  Please keep incisions clean and dry. Please do not Scrub or rub incisions. Do not use lotion or powder on incisions.   BATHING: You may shower and/or sponge bathe. You may use warm soapy water in the shower and rinse, pat dry.  ACTIVITY: No heavy lifting or straining. Otherwise, you may return to your usual level of physical activity. If you are taking narcotic pain medication DO NOT drive a car, operate machinery or make important decisions.  PAIN: You may take over-the-counter medications for pain. You make take Tylenol orally every 6 hours as needed. Do not exceed 4,000mg a day. You may take ibuprofen every 6 hours. You may alternate between the two for better pain control (every 3 hours). You have been prescribed narcotics for pain management. Please take as prescribed on pill bottle, AS NEEDED, for BREAKTHROUGH pain ONLY. If you are taking narcotic pain medication DO NOT drive a car, operate machinery or make important decisions.  DIET: Return to your usual diet.  NOTIFY YOUR SURGEON IF YOU HAVE: any bleeding that does not stop, any pus draining from your wound(s), any fever (over 100.4 F) persistent nausea/vomiting, or if your pain is not controlled on your discharge pain medications, unable to urinate.  FOLLOW UP:  1. Please follow up with your primary care physician in one week regarding your hospitalization, bring copies of your discharge paperwork.  2. Please follow up with your surgeon, Dr. Talamantes. Call [phone] to make an appointment.         SECONDARY DISCHARGE DIAGNOSES  Diagnosis: Deep vein thrombosis (DVT) prophylaxis prescribed at discharge  Assessment and Plan of Treatment: You were prescribed a low dose of the medication called Eliquis (Apixaban). This is a blood thinner used to prevent the formation of blood clot (DVT) after surgery. Continue to take this medication (1 tab, twice a day) until it is finished.   If you develop any signs of bleeding (bloody nose, vomiting blood, blood in stool or urine) STOP this medication and call to notify Dr. Talamantes's office. If you develop lightheadedness, dizziness, loss of consciousness in addition to signs of bleeding above, STOP this medication and seek immediate medical attention.    Diagnosis: Hypophosphatemia  Assessment and Plan of Treatment: Your phosphorus level (an electrolyte) was noted to be low after surgery which is common after liver resection. Take phosphorus supplement as prescribed until it is finished.

## 2024-10-29 NOTE — PHYSICAL THERAPY INITIAL EVALUATION ADULT - PERTINENT HX OF CURRENT PROBLEM, REHAB EVAL
Patient is a 79 year old Female, PMH stated below, presents s/p Cholecystectomy and Partial hepatectomy

## 2024-10-30 LAB
ALBUMIN SERPL ELPH-MCNC: 3.4 G/DL — SIGNIFICANT CHANGE UP (ref 3.3–5)
ALP SERPL-CCNC: 52 U/L — SIGNIFICANT CHANGE UP (ref 40–120)
ALT FLD-CCNC: 102 U/L — HIGH (ref 4–33)
ANION GAP SERPL CALC-SCNC: 11 MMOL/L — SIGNIFICANT CHANGE UP (ref 7–14)
AST SERPL-CCNC: 113 U/L — HIGH (ref 4–32)
BILIRUB DIRECT SERPL-MCNC: <0.2 MG/DL — SIGNIFICANT CHANGE UP (ref 0–0.3)
BILIRUB INDIRECT FLD-MCNC: >0.1 MG/DL — SIGNIFICANT CHANGE UP (ref 0–1)
BILIRUB SERPL-MCNC: 0.3 MG/DL — SIGNIFICANT CHANGE UP (ref 0.2–1.2)
BUN SERPL-MCNC: 10 MG/DL — SIGNIFICANT CHANGE UP (ref 7–23)
CALCIUM SERPL-MCNC: 8 MG/DL — LOW (ref 8.4–10.5)
CHLORIDE SERPL-SCNC: 103 MMOL/L — SIGNIFICANT CHANGE UP (ref 98–107)
CO2 SERPL-SCNC: 24 MMOL/L — SIGNIFICANT CHANGE UP (ref 22–31)
CREAT SERPL-MCNC: 0.68 MG/DL — SIGNIFICANT CHANGE UP (ref 0.5–1.3)
EGFR: 88 ML/MIN/1.73M2 — SIGNIFICANT CHANGE UP
GLUCOSE SERPL-MCNC: 116 MG/DL — HIGH (ref 70–99)
HCT VFR BLD CALC: 32.9 % — LOW (ref 34.5–45)
HGB BLD-MCNC: 10 G/DL — LOW (ref 11.5–15.5)
MAGNESIUM SERPL-MCNC: 2 MG/DL — SIGNIFICANT CHANGE UP (ref 1.6–2.6)
MCHC RBC-ENTMCNC: 25.3 PG — LOW (ref 27–34)
MCHC RBC-ENTMCNC: 30.4 G/DL — LOW (ref 32–36)
MCV RBC AUTO: 83.1 FL — SIGNIFICANT CHANGE UP (ref 80–100)
NRBC # BLD: 0 /100 WBCS — SIGNIFICANT CHANGE UP (ref 0–0)
NRBC # FLD: 0 K/UL — SIGNIFICANT CHANGE UP (ref 0–0)
PHOSPHATE SERPL-MCNC: 1.9 MG/DL — LOW (ref 2.5–4.5)
PLATELET # BLD AUTO: 210 K/UL — SIGNIFICANT CHANGE UP (ref 150–400)
POTASSIUM SERPL-MCNC: 3.8 MMOL/L — SIGNIFICANT CHANGE UP (ref 3.5–5.3)
POTASSIUM SERPL-SCNC: 3.8 MMOL/L — SIGNIFICANT CHANGE UP (ref 3.5–5.3)
PROT SERPL-MCNC: 6.2 G/DL — SIGNIFICANT CHANGE UP (ref 6–8.3)
RBC # BLD: 3.96 M/UL — SIGNIFICANT CHANGE UP (ref 3.8–5.2)
RBC # FLD: 26.7 % — HIGH (ref 10.3–14.5)
SODIUM SERPL-SCNC: 138 MMOL/L — SIGNIFICANT CHANGE UP (ref 135–145)
WBC # BLD: 8.6 K/UL — SIGNIFICANT CHANGE UP (ref 3.8–10.5)
WBC # FLD AUTO: 8.6 K/UL — SIGNIFICANT CHANGE UP (ref 3.8–10.5)

## 2024-10-30 RX ORDER — OXYCODONE HYDROCHLORIDE 30 MG/1
5 TABLET ORAL ONCE
Refills: 0 | Status: DISCONTINUED | OUTPATIENT
Start: 2024-10-30 | End: 2024-10-30

## 2024-10-30 RX ORDER — LIDOCAINE HYDROCHLORIDE 40 MG/ML
1 SOLUTION TOPICAL EVERY 24 HOURS
Refills: 0 | Status: DISCONTINUED | OUTPATIENT
Start: 2024-10-30 | End: 2024-10-31

## 2024-10-30 RX ORDER — POTASSIUM PHOSPHATE 236; 224 MG/ML; MG/ML
30 INJECTION, SOLUTION INTRAVENOUS ONCE
Refills: 0 | Status: COMPLETED | OUTPATIENT
Start: 2024-10-30 | End: 2024-10-30

## 2024-10-30 RX ADMIN — Medication 400 MILLIGRAM(S): at 12:22

## 2024-10-30 RX ADMIN — OXYCODONE HYDROCHLORIDE 5 MILLIGRAM(S): 30 TABLET ORAL at 04:34

## 2024-10-30 RX ADMIN — Medication 400 MILLIGRAM(S): at 23:02

## 2024-10-30 RX ADMIN — Medication 400 MILLIGRAM(S): at 04:34

## 2024-10-30 RX ADMIN — Medication 400 MILLIGRAM(S): at 11:52

## 2024-10-30 RX ADMIN — Medication 400 MILLIGRAM(S): at 23:32

## 2024-10-30 RX ADMIN — Medication 400 MILLIGRAM(S): at 05:04

## 2024-10-30 RX ADMIN — CYCLOBENZAPRINE HYDROCHLORIDE 5 MILLIGRAM(S): 30 CAPSULE, EXTENDED RELEASE ORAL at 09:23

## 2024-10-30 RX ADMIN — OXYCODONE HYDROCHLORIDE 5 MILLIGRAM(S): 30 TABLET ORAL at 05:04

## 2024-10-30 RX ADMIN — SODIUM CHLORIDE 3 MILLILITER(S): 9 INJECTION, SOLUTION INTRAMUSCULAR; INTRAVENOUS; SUBCUTANEOUS at 22:47

## 2024-10-30 RX ADMIN — SODIUM CHLORIDE 3 MILLILITER(S): 9 INJECTION, SOLUTION INTRAMUSCULAR; INTRAVENOUS; SUBCUTANEOUS at 14:45

## 2024-10-30 RX ADMIN — CYCLOBENZAPRINE HYDROCHLORIDE 5 MILLIGRAM(S): 30 CAPSULE, EXTENDED RELEASE ORAL at 23:02

## 2024-10-30 RX ADMIN — POTASSIUM PHOSPHATE 83.33 MILLIMOLE(S): 236; 224 INJECTION, SOLUTION INTRAVENOUS at 11:52

## 2024-10-30 RX ADMIN — Medication 40 MILLIGRAM(S): at 11:52

## 2024-10-30 RX ADMIN — Medication 10 MILLIGRAM(S): at 12:46

## 2024-10-30 RX ADMIN — Medication 400 MILLIGRAM(S): at 18:33

## 2024-10-30 RX ADMIN — SODIUM CHLORIDE 3 MILLILITER(S): 9 INJECTION, SOLUTION INTRAMUSCULAR; INTRAVENOUS; SUBCUTANEOUS at 04:59

## 2024-10-30 NOTE — CHART NOTE - NSCHARTNOTEFT_GEN_A_CORE
Patient will require rolling walker at home due to their dx of a gallbladder mass. Rolling walker will help complete the MRADLs.

## 2024-10-30 NOTE — PROGRESS NOTE ADULT - SUBJECTIVE AND OBJECTIVE BOX
D Surgery Daily Progress Note  =====================================================    SUBJECTIVE: Patient seen and examined at bedside on AM rounds. Pt c/o pain lower abdomen, states feels like gas pain. States no passing flatus. Reports poor appetitive but is tolerating diet, denies N/V    ALLERGIES:  Flonase (Other)      --------------------------------------------------------------------------------------    MEDICATIONS:    Neurologic Medications  acetaminophen     Tablet .. 650 milliGRAM(s) Oral every 6 hours PRN Temp greater or equal to 38C (100.4F), Mild Pain (1 - 3)  cyclobenzaprine 5 milliGRAM(s) Oral three times a day PRN Abdominal pain  ibuprofen  Tablet. 400 milliGRAM(s) Oral every 6 hours  melatonin 3 milliGRAM(s) Oral at bedtime PRN Insomnia  ondansetron Injectable 4 milliGRAM(s) IV Push every 8 hours PRN Nausea and/or Vomiting    Respiratory Medications    Cardiovascular Medications    Gastrointestinal Medications  aluminum hydroxide/magnesium hydroxide/simethicone Suspension 30 milliLiter(s) Oral every 4 hours PRN Dyspepsia  sodium chloride 0.9% lock flush 3 milliLiter(s) IV Push every 8 hours    Genitourinary Medications    Hematologic/Oncologic Medications  enoxaparin Injectable 40 milliGRAM(s) SubCutaneous every 24 hours  influenza  Vaccine (HIGH DOSE) 0.5 milliLiter(s) IntraMuscular once    Antimicrobial/Immunologic Medications    Endocrine/Metabolic Medications    Topical/Other Medications  lidocaine   4% Patch 1 Patch Transdermal every 24 hours    --------------------------------------------------------------------------------------    VITAL SIGNS:  T(C): 36.9 (10-30-24 @ 04:24), Max: 37.3 (10-29-24 @ 20:46)  HR: 85 (10-30-24 @ 04:24) (84 - 96)  BP: 149/80 (10-30-24 @ 04:24) (111/56 - 149/80)  RR: 18 (10-30-24 @ 04:24) (16 - 18)  SpO2: 99% (10-30-24 @ 04:24) (96% - 100%)  --------------------------------------------------------------------------------------    INS AND OUTS:    10-29-24 @ 07:01  -  10-30-24 @ 07:00  --------------------------------------------------------  IN: 1110 mL / OUT: 1835 mL / NET: -725 mL      --------------------------------------------------------------------------------------    PHYSICAL EXAM:  Constitutional: A&Ox3, NAD  Respiratory: Unlabored breathing  Abdomen: Soft, nondistended, NTTP. No rebound or guarding. ZEYNEP w/ SS output  Extremities: WWP, MENDEZ spontaneously  --------------------------------------------------------------------------------------    LABS      CBC Full  -  ( 30 Oct 2024 06:16 )  WBC Count : 8.60 K/uL  RBC Count : 3.96 M/uL  Hemoglobin : 10.0 g/dL  Hematocrit : 32.9 %  Platelet Count - Automated : 210 K/uL  Mean Cell Volume : 83.1 fL  Mean Cell Hemoglobin : 25.3 pg  Mean Cell Hemoglobin Concentration : 30.4 g/dL  Auto Neutrophil # : x  Auto Lymphocyte # : x  Auto Monocyte # : x  Auto Eosinophil # : x  Auto Basophil # : x  Auto Neutrophil % : x  Auto Lymphocyte % : x  Auto Monocyte % : x  Auto Eosinophil % : x  Auto Basophil % : x    10-30    138  |  103  |  10  ----------------------------<  116[H]  3.8   |  24  |  0.68    Ca    8.0[L]      30 Oct 2024 06:16  Phos  1.9     10-30  Mg     2.00     10-30    TPro  6.2  /  Alb  3.4  /  TBili  0.3  /  DBili  <0.2  /  AST  113[H]  /  ALT  102[H]  /  AlkPhos  52  10-30    LIVER FUNCTIONS - ( 30 Oct 2024 06:16 )  Alb: 3.4 g/dL / Pro: 6.2 g/dL / ALK PHOS: 52 U/L / ALT: 102 U/L / AST: 113 U/L / GGT: x             Urinalysis Basic - ( 30 Oct 2024 06:16 )    Color: x / Appearance: x / SG: x / pH: x  Gluc: 116 mg/dL / Ketone: x  / Bili: x / Urobili: x   Blood: x / Protein: x / Nitrite: x   Leuk Esterase: x / RBC: x / WBC x   Sq Epi: x / Non Sq Epi: x / Bacteria: x

## 2024-10-30 NOTE — PROGRESS NOTE ADULT - ASSESSMENT
Assessment:  79y Female s/p cholecystectomy and partial hepatectomy.     Plan:  - Zosyn   - C/w Pain control, add lidocaine patch  - Regular diet  - DC drain prior dc  - Discharge planning     [id number]

## 2024-10-31 ENCOUNTER — TRANSCRIPTION ENCOUNTER (OUTPATIENT)
Age: 79
End: 2024-10-31

## 2024-10-31 LAB
ALBUMIN SERPL ELPH-MCNC: 3.3 G/DL — SIGNIFICANT CHANGE UP (ref 3.3–5)
ALP SERPL-CCNC: 59 U/L — SIGNIFICANT CHANGE UP (ref 40–120)
ALT FLD-CCNC: 77 U/L — HIGH (ref 4–33)
ANION GAP SERPL CALC-SCNC: 10 MMOL/L — SIGNIFICANT CHANGE UP (ref 7–14)
AST SERPL-CCNC: 66 U/L — HIGH (ref 4–32)
BILIRUB DIRECT SERPL-MCNC: <0.2 MG/DL — SIGNIFICANT CHANGE UP (ref 0–0.3)
BILIRUB INDIRECT FLD-MCNC: >0 MG/DL — SIGNIFICANT CHANGE UP (ref 0–1)
BILIRUB SERPL-MCNC: 0.2 MG/DL — SIGNIFICANT CHANGE UP (ref 0.2–1.2)
BUN SERPL-MCNC: 9 MG/DL — SIGNIFICANT CHANGE UP (ref 7–23)
CALCIUM SERPL-MCNC: 8 MG/DL — LOW (ref 8.4–10.5)
CHLORIDE SERPL-SCNC: 104 MMOL/L — SIGNIFICANT CHANGE UP (ref 98–107)
CO2 SERPL-SCNC: 25 MMOL/L — SIGNIFICANT CHANGE UP (ref 22–31)
CREAT SERPL-MCNC: 0.65 MG/DL — SIGNIFICANT CHANGE UP (ref 0.5–1.3)
EGFR: 89 ML/MIN/1.73M2 — SIGNIFICANT CHANGE UP
GLUCOSE SERPL-MCNC: 104 MG/DL — HIGH (ref 70–99)
HCT VFR BLD CALC: 33.1 % — LOW (ref 34.5–45)
HGB BLD-MCNC: 9.9 G/DL — LOW (ref 11.5–15.5)
MAGNESIUM SERPL-MCNC: 2.2 MG/DL — SIGNIFICANT CHANGE UP (ref 1.6–2.6)
MCHC RBC-ENTMCNC: 25.3 PG — LOW (ref 27–34)
MCHC RBC-ENTMCNC: 29.9 G/DL — LOW (ref 32–36)
MCV RBC AUTO: 84.4 FL — SIGNIFICANT CHANGE UP (ref 80–100)
NRBC # BLD: 0 /100 WBCS — SIGNIFICANT CHANGE UP (ref 0–0)
NRBC # FLD: 0 K/UL — SIGNIFICANT CHANGE UP (ref 0–0)
PHOSPHATE SERPL-MCNC: 1.9 MG/DL — LOW (ref 2.5–4.5)
PLATELET # BLD AUTO: 199 K/UL — SIGNIFICANT CHANGE UP (ref 150–400)
POTASSIUM SERPL-MCNC: 3.9 MMOL/L — SIGNIFICANT CHANGE UP (ref 3.5–5.3)
POTASSIUM SERPL-SCNC: 3.9 MMOL/L — SIGNIFICANT CHANGE UP (ref 3.5–5.3)
PROT SERPL-MCNC: 5.9 G/DL — LOW (ref 6–8.3)
RBC # BLD: 3.92 M/UL — SIGNIFICANT CHANGE UP (ref 3.8–5.2)
RBC # FLD: 26.6 % — HIGH (ref 10.3–14.5)
SODIUM SERPL-SCNC: 139 MMOL/L — SIGNIFICANT CHANGE UP (ref 135–145)
WBC # BLD: 7.61 K/UL — SIGNIFICANT CHANGE UP (ref 3.8–10.5)
WBC # FLD AUTO: 7.61 K/UL — SIGNIFICANT CHANGE UP (ref 3.8–10.5)

## 2024-10-31 RX ORDER — ROSUVASTATIN CALCIUM 10 MG
20 TABLET ORAL AT BEDTIME
Refills: 0 | Status: DISCONTINUED | OUTPATIENT
Start: 2024-10-31 | End: 2024-10-31

## 2024-10-31 RX ORDER — APIXABAN 5 MG/1
1 TABLET, FILM COATED ORAL
Qty: 56 | Refills: 0
Start: 2024-10-31 | End: 2024-11-27

## 2024-10-31 RX ORDER — LISINOPRIL 40 MG
40 TABLET ORAL DAILY
Refills: 0 | Status: DISCONTINUED | OUTPATIENT
Start: 2024-10-31 | End: 2024-10-31

## 2024-10-31 RX ORDER — LEVOTHYROXINE SODIUM 88 MCG
137 TABLET ORAL DAILY
Refills: 0 | Status: DISCONTINUED | OUTPATIENT
Start: 2024-10-31 | End: 2024-10-31

## 2024-10-31 RX ORDER — LIDOCAINE HYDROCHLORIDE 40 MG/ML
1 SOLUTION TOPICAL
Qty: 7 | Refills: 0
Start: 2024-10-31 | End: 2024-11-06

## 2024-10-31 RX ORDER — DIBASIC SODIUM PHOSPHATE, MONOBASIC POTASSIUM PHOSPHATE AND MONOBASIC SODIUM PHOSPHATE 852; 155; 130 MG/1; MG/1; MG/1
1 TABLET ORAL
Qty: 10 | Refills: 0
Start: 2024-10-31 | End: 2024-11-04

## 2024-10-31 RX ORDER — CYCLOBENZAPRINE HYDROCHLORIDE 30 MG/1
1 CAPSULE, EXTENDED RELEASE ORAL
Qty: 21 | Refills: 0
Start: 2024-10-31 | End: 2024-11-06

## 2024-10-31 RX ORDER — DIBASIC SODIUM PHOSPHATE, MONOBASIC POTASSIUM PHOSPHATE AND MONOBASIC SODIUM PHOSPHATE 852; 155; 130 MG/1; MG/1; MG/1
1 TABLET ORAL
Refills: 0 | Status: DISCONTINUED | OUTPATIENT
Start: 2024-10-31 | End: 2024-10-31

## 2024-10-31 RX ADMIN — Medication 400 MILLIGRAM(S): at 05:03

## 2024-10-31 RX ADMIN — DIBASIC SODIUM PHOSPHATE, MONOBASIC POTASSIUM PHOSPHATE AND MONOBASIC SODIUM PHOSPHATE 1 TABLET(S): 852; 155; 130 TABLET ORAL at 12:16

## 2024-10-31 RX ADMIN — LIDOCAINE HYDROCHLORIDE 1 PATCH: 40 SOLUTION TOPICAL at 12:18

## 2024-10-31 RX ADMIN — Medication 40 MILLIGRAM(S): at 06:44

## 2024-10-31 RX ADMIN — Medication 40 MILLIGRAM(S): at 12:19

## 2024-10-31 RX ADMIN — Medication 400 MILLIGRAM(S): at 05:33

## 2024-10-31 RX ADMIN — Medication 137 MICROGRAM(S): at 06:44

## 2024-10-31 RX ADMIN — SODIUM CHLORIDE 3 MILLILITER(S): 9 INJECTION, SOLUTION INTRAMUSCULAR; INTRAVENOUS; SUBCUTANEOUS at 05:33

## 2024-10-31 RX ADMIN — Medication 400 MILLIGRAM(S): at 12:20

## 2024-10-31 NOTE — DISCHARGE NOTE NURSING/CASE MANAGEMENT/SOCIAL WORK - NSDCPNDISPN_GEN_ALL_CORE
Patient would like to have annual labs complete prior to his appt on 9/19.    Please advise patient when ordered.   Education provided on the pain management plan of care/Side effects of pain management treatment/Activities of daily living, including home environment that might     exacerbate pain or reduce effectiveness of the pain management plan of care as well as strategies to address these issues/Safe use, storage and disposal of opioids when prescribed

## 2024-10-31 NOTE — DISCHARGE NOTE NURSING/CASE MANAGEMENT/SOCIAL WORK - PATIENT PORTAL LINK FT
You can access the FollowMyHealth Patient Portal offered by [facility] by registering at the following website: [email or web address]. By joining [facility]’s FollowMyHealth portal, you will also be able to view your health information using other applications (apps) compatible with our system.

## 2024-10-31 NOTE — PROGRESS NOTE ADULT - SUBJECTIVE AND OBJECTIVE BOX
TEAM [ D ] Surgery Daily Progress Note  =====================================================    SUBJECTIVE: Patient seen and examined at bedside on AM rounds. Patient reports that they're feeling well. Denies fever, chills, N/V, chest pain, SOB    ALLERGIES:  Flonase (Other)    -------------------------------------------------------------------------------------    MEDICATIONS:  acetaminophen     Tablet .. 650 milliGRAM(s) Oral every 6 hours PRN  aluminum hydroxide/magnesium hydroxide/simethicone Suspension 30 milliLiter(s) Oral every 4 hours PRN  cyclobenzaprine 5 milliGRAM(s) Oral three times a day PRN  enoxaparin Injectable 40 milliGRAM(s) SubCutaneous every 24 hours  ibuprofen  Tablet. 400 milliGRAM(s) Oral every 6 hours  influenza  Vaccine (HIGH DOSE) 0.5 milliLiter(s) IntraMuscular once  lidocaine   4% Patch 1 Patch Transdermal every 24 hours  melatonin 3 milliGRAM(s) Oral at bedtime PRN  ondansetron Injectable 4 milliGRAM(s) IV Push every 8 hours PRN  sodium chloride 0.9% lock flush 3 milliLiter(s) IV Push every 8 hours    --------------------------------------------------------------------------------------    VITAL SIGNS:  T(C): 36.5 (10-31-24 @ 04:19), Max: 37 (10-30-24 @ 20:28)  HR: 85 (10-31-24 @ 04:19) (85 - 99)  BP: 136/71 (10-31-24 @ 04:19) (116/58 - 138/75)  RR: 18 (10-31-24 @ 04:19) (17 - 18)  SpO2: 100% (10-31-24 @ 04:19) (99% - 100%)  --------------------------------------------------------------------------------------    INS AND OUTS:    10-29-24 @ 07:01  -  10-30-24 @ 07:00  --------------------------------------------------------  IN: 1110 mL / OUT: 1835 mL / NET: -725 mL    10-30-24 @ 07:01  -  10-31-24 @ 05:56  --------------------------------------------------------  IN: 1040 mL / OUT: 1465 mL / NET: -425 mL      --------------------------------------------------------------------------------------      EXAM    General: NAD, resting in bed comfortably.  Cardiac: regular rate, warm and well perfused  Respiratory: Nonlabored respirations, normal cw expansion.  Abdomen: soft, nontender, nondistended  Extremities: normal strength, FROM, no deformities    --------------------------------------------------------------------------------------    LABS       TEAM [ D ] Surgery Daily Progress Note  =====================================================    SUBJECTIVE: Patient seen and examined at bedside on AM rounds. Patient reports that they're feeling well. She is passing gas and having bowel movements. She is tolerating a regular diet. Denies fever, chills, N/V, chest pain, SOB    ALLERGIES:  Flonase (Other)    -------------------------------------------------------------------------------------    MEDICATIONS:  acetaminophen     Tablet .. 650 milliGRAM(s) Oral every 6 hours PRN  aluminum hydroxide/magnesium hydroxide/simethicone Suspension 30 milliLiter(s) Oral every 4 hours PRN  cyclobenzaprine 5 milliGRAM(s) Oral three times a day PRN  enoxaparin Injectable 40 milliGRAM(s) SubCutaneous every 24 hours  ibuprofen  Tablet. 400 milliGRAM(s) Oral every 6 hours  influenza  Vaccine (HIGH DOSE) 0.5 milliLiter(s) IntraMuscular once  lidocaine   4% Patch 1 Patch Transdermal every 24 hours  melatonin 3 milliGRAM(s) Oral at bedtime PRN  ondansetron Injectable 4 milliGRAM(s) IV Push every 8 hours PRN  sodium chloride 0.9% lock flush 3 milliLiter(s) IV Push every 8 hours    --------------------------------------------------------------------------------------    VITAL SIGNS:  T(C): 36.5 (10-31-24 @ 04:19), Max: 37 (10-30-24 @ 20:28)  HR: 85 (10-31-24 @ 04:19) (85 - 99)  BP: 136/71 (10-31-24 @ 04:19) (116/58 - 138/75)  RR: 18 (10-31-24 @ 04:19) (17 - 18)  SpO2: 100% (10-31-24 @ 04:19) (99% - 100%)  --------------------------------------------------------------------------------------    INS AND OUTS:    10-29-24 @ 07:01  -  10-30-24 @ 07:00  --------------------------------------------------------  IN: 1110 mL / OUT: 1835 mL / NET: -725 mL    10-30-24 @ 07:01  -  10-31-24 @ 05:56  --------------------------------------------------------  IN: 1040 mL / OUT: 1465 mL / NET: -425 mL      --------------------------------------------------------------------------------------      EXAM    General: NAD, resting in bed comfortably.  Cardiac: regular rate, warm and well perfused  Respiratory: Nonlabored respirations, normal cw expansion.  Abdomen: soft, nontender, nondistended, port sites c/d/i, ZEYNEP x 1 serous  Extremities: normal strength, FROM, no deformities    --------------------------------------------------------------------------------------    LABS                        9.9    7.61  )-----------( 199      ( 31 Oct 2024 05:52 )             33.1   10-31    139  |  104  |  9   ----------------------------<  104[H]  3.9   |  25  |  0.65    Ca    8.0[L]      31 Oct 2024 05:52  Phos  1.9     10-31  Mg     2.20     10-31    TPro  5.9[L]  /  Alb  3.3  /  TBili  0.2  /  DBili  <0.2  /  AST  66[H]  /  ALT  77[H]  /  AlkPhos  59  10-31

## 2024-10-31 NOTE — DISCHARGE NOTE NURSING/CASE MANAGEMENT/SOCIAL WORK - FINANCIAL ASSISTANCE
[facility] provides services at a reduced cost to those who are determined to be eligible through [facility]’s financial assistance program. Information regarding [facility]’s financial assistance program can be found by going to [email or web address] or by calling [phone].

## 2024-10-31 NOTE — PROGRESS NOTE ADULT - ASSESSMENT
79 year old female with PMH anemia, HTN, HLD, hypothyroidism presents s/p robotic cholecystectomy and partial hepatectomy on 10/28 for gallbladder mass    Plan  - Diet: regular  - Pain control  - DVT prophylaxis  - PT recommends outpatient PT with rolling walker    D team surgery [id number]   79 year old female with PMH anemia, HTN, HLD, hypothyroidism presents s/p robotic cholecystectomy and partial hepatectomy on 10/28 for gallbladder mass    Plan  - Diet: regular  - Pain control  - Home meds restarted  - DVT prophylaxis  - PT recommends outpatient PT with rolling walker    D team surgery [id number]   79 year old female with PMH anemia, HTN, HLD, hypothyroidism presents s/p robotic cholecystectomy and partial hepatectomy on 10/28 for gallbladder mass    Plan  - Diet: regular  - Pain control  - Home meds restarted  - DVT prophylaxis  - PT recommends outpatient PT with rolling walker]  - Discharge home today, remove ZEYNEP [place] team surgery [id number]

## 2024-11-01 ENCOUNTER — NON-APPOINTMENT (OUTPATIENT)
Age: 79
End: 2024-11-01

## 2024-11-01 VITALS
SYSTOLIC BLOOD PRESSURE: 93 MMHG | DIASTOLIC BLOOD PRESSURE: 48 MMHG | TEMPERATURE: 99 F | RESPIRATION RATE: 16 BRPM | OXYGEN SATURATION: 100 % | HEART RATE: 72 BPM

## 2024-11-01 PROBLEM — E78.5 HYPERLIPIDEMIA, UNSPECIFIED: Chronic | Status: ACTIVE | Noted: 2024-10-14

## 2024-11-01 PROBLEM — M19.90 UNSPECIFIED OSTEOARTHRITIS, UNSPECIFIED SITE: Chronic | Status: ACTIVE | Noted: 2024-10-17

## 2024-11-05 RX ORDER — LATANOPROST 50 UG/ML
1 SOLUTION OPHTHALMIC
Refills: 0 | DISCHARGE

## 2024-11-05 RX ORDER — FOLIC ACID 1 MG/1
1 TABLET ORAL
Refills: 0 | DISCHARGE

## 2024-11-05 RX ORDER — VIBEGRON 75 MG/1
1 TABLET, FILM COATED ORAL
Refills: 0 | DISCHARGE

## 2024-11-05 RX ORDER — ROSUVASTATIN CALCIUM 20 MG/1
1 TABLET, COATED ORAL
Refills: 0 | DISCHARGE

## 2024-11-05 RX ORDER — FERROUS SULFATE 325(65) MG
65 TABLET ORAL
Refills: 0 | DISCHARGE

## 2024-11-05 NOTE — CHART NOTE - NSCHARTNOTEFT_GEN_A_CORE
Post-Discharge Medication Review: Completed	  	  Patient's preferred pharmacy was updated in [facility]: [facility]	  	  Patient contacted to offer medication counseling post-discharge. Medication reconciliation completed. Per patient, medications include:	  	  1.	acetaminophen 325 mg oral tablet 2 tab(s) orally every 6 hours As needed Temp greater or equal to 38C (100.4F), Mild Pain (1 - 3)  2.	cyclobenzaprine 5 mg oral tablet 1 tab(s) orally 3 times a day As needed Abdominal pain  3.	Eliquis 2.5 mg oral tablet 1 tab(s) orally 2 times a day MDD: 2  4.	ferrous sulfate 65 milligram(s) orally 2 times a day  5.	folic acid 1 mg oral tablet 1 tab(s) orally once a day (at bedtime)  6.	Gemtesa 75 mg oral tablet 1 tab(s) orally once a day (at bedtime)  7.	ibuprofen 400 mg oral tablet 1 tab(s) orally every 6 hours  8.	K-Phos Neutral oral tablet 1 tab(s) orally 2 times a day MDD: 2  9.	latanoprost 0.005% ophthalmic emulsion 1 drop(s) in each affected eye once a day (at bedtime)  10.	lidocaine 4% topical film Apply topically to affected area once a day MDD: 1  11.	ramipril 10 mg oral capsule 1 cap(s) orally once a day (at bedtime)  12.	rosuvastatin 20 mg oral tablet 1 tab(s) orally once a day (at bedtime)  13.	Synthroid 137 mcg (0.137 mg) oral tablet 1 tab(s) orally once a day (at bedtime)   	    	  Medication name, indication, administration, side effect, and monitoring reviewed for new medications during post discharge counseling visit with patient. Patient demonstrated understanding. Counseling offered for all medications.	  	  Patient stated that during hospital visit, her PCP prescribed metoprolol succinate 25 mg daily to patient's local pharmacy. Patient was not able to specify indication and not sure whether to initiate medication. Patient latest BP and HR inpatient was  93/48 mm Hg and 72(beats/min). Recommended  that patient follow up with prescribing physician before initiating medication.  Inpatient team was notified as well. 	  	  	  	  	  	  	  	  Oscar Villareal, Mino	  Clinical Pharmacy Specialist, Pharmacy Telehealth Team	  Can be reached via [place] Teams or [phone]

## 2024-11-13 ENCOUNTER — APPOINTMENT (OUTPATIENT)
Dept: SURGICAL ONCOLOGY | Facility: CLINIC | Age: 79
End: 2024-11-13
Payer: MEDICARE

## 2024-11-13 VITALS
WEIGHT: 184 LBS | HEART RATE: 84 BPM | OXYGEN SATURATION: 99 % | BODY MASS INDEX: 28.88 KG/M2 | SYSTOLIC BLOOD PRESSURE: 149 MMHG | HEIGHT: 67 IN | DIASTOLIC BLOOD PRESSURE: 90 MMHG

## 2024-11-13 DIAGNOSIS — K82.8 OTHER SPECIFIED DISEASES OF GALLBLADDER: ICD-10-CM

## 2024-11-13 LAB — SURGICAL PATHOLOGY STUDY: SIGNIFICANT CHANGE UP

## 2024-11-13 PROCEDURE — 99024 POSTOP FOLLOW-UP VISIT: CPT

## 2024-11-16 ENCOUNTER — OUTPATIENT (OUTPATIENT)
Dept: OUTPATIENT SERVICES | Facility: HOSPITAL | Age: 79
LOS: 1 days | Discharge: HOME | End: 2024-11-16

## 2024-11-16 DIAGNOSIS — C23 MALIGNANT NEOPLASM OF GALLBLADDER: ICD-10-CM

## 2024-11-16 DIAGNOSIS — Z98.890 OTHER SPECIFIED POSTPROCEDURAL STATES: Chronic | ICD-10-CM

## 2024-11-16 DIAGNOSIS — E89.0 POSTPROCEDURAL HYPOTHYROIDISM: Chronic | ICD-10-CM

## 2024-11-16 DIAGNOSIS — Z98.49 CATARACT EXTRACTION STATUS, UNSPECIFIED EYE: Chronic | ICD-10-CM

## 2024-11-19 ENCOUNTER — NON-APPOINTMENT (OUTPATIENT)
Age: 79
End: 2024-11-19

## 2024-11-20 ENCOUNTER — RESULT REVIEW (OUTPATIENT)
Age: 79
End: 2024-11-20

## 2024-11-20 ENCOUNTER — APPOINTMENT (OUTPATIENT)
Dept: HEMATOLOGY ONCOLOGY | Facility: CLINIC | Age: 79
End: 2024-11-20

## 2024-11-20 VITALS
HEIGHT: 68 IN | BODY MASS INDEX: 27.28 KG/M2 | TEMPERATURE: 98 F | HEART RATE: 75 BPM | RESPIRATION RATE: 17 BRPM | DIASTOLIC BLOOD PRESSURE: 88 MMHG | SYSTOLIC BLOOD PRESSURE: 148 MMHG | WEIGHT: 180 LBS | OXYGEN SATURATION: 96 %

## 2024-11-20 DIAGNOSIS — I10 ESSENTIAL (PRIMARY) HYPERTENSION: ICD-10-CM

## 2024-11-20 DIAGNOSIS — E78.00 PURE HYPERCHOLESTEROLEMIA, UNSPECIFIED: ICD-10-CM

## 2024-11-20 DIAGNOSIS — C23 MALIGNANT NEOPLASM OF GALLBLADDER: ICD-10-CM

## 2024-11-20 LAB
BASOPHILS # BLD AUTO: 0.02 K/UL — SIGNIFICANT CHANGE UP (ref 0–0.2)
BASOPHILS NFR BLD AUTO: 0.6 % — SIGNIFICANT CHANGE UP (ref 0–2)
EOSINOPHIL # BLD AUTO: 0.23 K/UL — SIGNIFICANT CHANGE UP (ref 0–0.5)
EOSINOPHIL NFR BLD AUTO: 7.4 % — HIGH (ref 0–6)
HCT VFR BLD CALC: 41 % — SIGNIFICANT CHANGE UP (ref 34.5–45)
HGB BLD-MCNC: 12.5 G/DL — SIGNIFICANT CHANGE UP (ref 11.5–15.5)
IMM GRANULOCYTES NFR BLD AUTO: 0 % — SIGNIFICANT CHANGE UP (ref 0–0.9)
LYMPHOCYTES # BLD AUTO: 1.44 K/UL — SIGNIFICANT CHANGE UP (ref 1–3.3)
LYMPHOCYTES # BLD AUTO: 46.6 % — HIGH (ref 13–44)
MCHC RBC-ENTMCNC: 26.1 PG — LOW (ref 27–34)
MCHC RBC-ENTMCNC: 30.5 G/DL — LOW (ref 32–36)
MCV RBC AUTO: 85.6 FL — SIGNIFICANT CHANGE UP (ref 80–100)
MONOCYTES # BLD AUTO: 0.32 K/UL — SIGNIFICANT CHANGE UP (ref 0–0.9)
MONOCYTES NFR BLD AUTO: 10.4 % — SIGNIFICANT CHANGE UP (ref 2–14)
NEUTROPHILS # BLD AUTO: 1.08 K/UL — LOW (ref 1.8–7.4)
NEUTROPHILS NFR BLD AUTO: 35 % — LOW (ref 43–77)
NRBC # BLD: 0 /100 WBCS — SIGNIFICANT CHANGE UP (ref 0–0)
NRBC BLD-RTO: 0 /100 WBCS — SIGNIFICANT CHANGE UP (ref 0–0)
PLATELET # BLD AUTO: 281 K/UL — SIGNIFICANT CHANGE UP (ref 150–400)
RBC # BLD: 4.79 M/UL — SIGNIFICANT CHANGE UP (ref 3.8–5.2)
RBC # FLD: 22.4 % — HIGH (ref 10.3–14.5)
WBC # BLD: 3.09 K/UL — LOW (ref 3.8–10.5)
WBC # FLD AUTO: 3.09 K/UL — LOW (ref 3.8–10.5)

## 2024-11-20 PROCEDURE — 99205 OFFICE O/P NEW HI 60 MIN: CPT

## 2024-11-20 PROCEDURE — C1889: CPT

## 2024-11-20 PROCEDURE — 45388 COLONOSCOPY W/ABLATION: CPT

## 2024-11-20 PROCEDURE — G2211 COMPLEX E/M VISIT ADD ON: CPT

## 2024-11-20 RX ORDER — METOPROLOL SUCCINATE 25 MG/1
25 TABLET, EXTENDED RELEASE ORAL DAILY
Refills: 0 | Status: ACTIVE | COMMUNITY
Start: 2024-11-20

## 2024-11-20 RX ORDER — LATANOPROST/PF 0.005 %
0.01 DROPS OPHTHALMIC (EYE) DAILY
Refills: 0 | Status: ACTIVE | COMMUNITY
Start: 2024-11-20

## 2024-11-20 RX ORDER — APIXABAN 2.5 MG/1
2.5 TABLET, FILM COATED ORAL
Qty: 60 | Refills: 0 | Status: ACTIVE | COMMUNITY
Start: 2024-11-20

## 2024-11-20 RX ORDER — VALSARTAN 320 MG/1
320 TABLET, COATED ORAL DAILY
Refills: 0 | Status: ACTIVE | COMMUNITY
Start: 2024-11-20

## 2024-11-21 ENCOUNTER — NON-APPOINTMENT (OUTPATIENT)
Age: 79
End: 2024-11-21

## 2024-11-21 LAB
ALBUMIN SERPL ELPH-MCNC: 4.3 G/DL
ALP BLD-CCNC: 96 U/L
ALT SERPL-CCNC: 23 U/L
ANION GAP SERPL CALC-SCNC: 13 MMOL/L
APTT BLD: 31.5 SEC
AST SERPL-CCNC: 26 U/L
BILIRUB SERPL-MCNC: <0.2 MG/DL
BUN SERPL-MCNC: 16 MG/DL
CALCIUM SERPL-MCNC: 9 MG/DL
CANCER AG19-9 SERPL-ACNC: <2 U/ML
CHLORIDE SERPL-SCNC: 103 MMOL/L
CO2 SERPL-SCNC: 26 MMOL/L
CREAT SERPL-MCNC: 0.83 MG/DL
EGFR: 71 ML/MIN/1.73M2
FERRITIN SERPL-MCNC: 227 NG/ML
GLUCOSE SERPL-MCNC: 96 MG/DL
INR PPP: 0.98 RATIO
IRON SATN MFR SERPL: 17 %
IRON SERPL-MCNC: 52 UG/DL
POTASSIUM SERPL-SCNC: 4.7 MMOL/L
PROT SERPL-MCNC: 7.3 G/DL
PT BLD: 11.6 SEC
SODIUM SERPL-SCNC: 143 MMOL/L
TIBC SERPL-MCNC: 306 UG/DL
UIBC SERPL-MCNC: 254 UG/DL

## 2024-11-26 ENCOUNTER — APPOINTMENT (OUTPATIENT)
Dept: CT IMAGING | Facility: IMAGING CENTER | Age: 79
End: 2024-11-26
Payer: MEDICARE

## 2024-11-26 ENCOUNTER — RESULT REVIEW (OUTPATIENT)
Age: 79
End: 2024-11-26

## 2024-11-26 ENCOUNTER — OUTPATIENT (OUTPATIENT)
Dept: OUTPATIENT SERVICES | Facility: HOSPITAL | Age: 79
LOS: 1 days | End: 2024-11-26
Payer: MEDICARE

## 2024-11-26 DIAGNOSIS — Z98.890 OTHER SPECIFIED POSTPROCEDURAL STATES: Chronic | ICD-10-CM

## 2024-11-26 DIAGNOSIS — C23 MALIGNANT NEOPLASM OF GALLBLADDER: ICD-10-CM

## 2024-11-26 DIAGNOSIS — Z98.49 CATARACT EXTRACTION STATUS, UNSPECIFIED EYE: Chronic | ICD-10-CM

## 2024-11-26 DIAGNOSIS — E89.0 POSTPROCEDURAL HYPOTHYROIDISM: Chronic | ICD-10-CM

## 2024-11-26 PROCEDURE — 74177 CT ABD & PELVIS W/CONTRAST: CPT

## 2024-11-26 PROCEDURE — 71260 CT THORAX DX C+: CPT

## 2024-11-26 PROCEDURE — 71260 CT THORAX DX C+: CPT | Mod: 26,MH

## 2024-11-26 PROCEDURE — 74177 CT ABD & PELVIS W/CONTRAST: CPT | Mod: 26,MH

## 2024-12-08 PROBLEM — Z91.89 QUIT USING TOBACCO IN REMOTE PAST: Status: ACTIVE | Noted: 2024-12-08

## 2024-12-08 PROBLEM — Z86.79 HISTORY OF ESSENTIAL HYPERTENSION: Status: RESOLVED | Noted: 2024-11-20 | Resolved: 2024-12-08

## 2024-12-08 PROBLEM — D64.9 ANEMIA OF UNKNOWN ETIOLOGY: Status: RESOLVED | Noted: 2024-12-08 | Resolved: 2024-12-08

## 2024-12-08 PROBLEM — E03.9 HYPOTHYROIDISM IN ADULT: Status: RESOLVED | Noted: 2024-12-08 | Resolved: 2024-12-08

## 2024-12-08 PROBLEM — Z86.39 HISTORY OF HYPERLIPIDEMIA: Status: RESOLVED | Noted: 2024-12-08 | Resolved: 2024-12-08

## 2024-12-10 ENCOUNTER — APPOINTMENT (OUTPATIENT)
Dept: SURGICAL ONCOLOGY | Facility: CLINIC | Age: 79
End: 2024-12-10

## 2024-12-10 VITALS
HEART RATE: 80 BPM | OXYGEN SATURATION: 99 % | HEIGHT: 68 IN | BODY MASS INDEX: 27.28 KG/M2 | WEIGHT: 180 LBS | SYSTOLIC BLOOD PRESSURE: 146 MMHG | DIASTOLIC BLOOD PRESSURE: 75 MMHG

## 2024-12-10 DIAGNOSIS — Z87.891 PERSONAL HISTORY OF NICOTINE DEPENDENCE: ICD-10-CM

## 2024-12-10 DIAGNOSIS — C23 MALIGNANT NEOPLASM OF GALLBLADDER: ICD-10-CM

## 2024-12-10 DIAGNOSIS — Z63.4 DISAPPEARANCE AND DEATH OF FAMILY MEMBER: ICD-10-CM

## 2024-12-10 DIAGNOSIS — Z90.49 ACQUIRED ABSENCE OF OTHER SPECIFIED PARTS OF DIGESTIVE TRACT: ICD-10-CM

## 2024-12-10 PROCEDURE — 99024 POSTOP FOLLOW-UP VISIT: CPT

## 2024-12-10 SDOH — SOCIAL STABILITY - SOCIAL INSECURITY: DISSAPEARANCE AND DEATH OF FAMILY MEMBER: Z63.4

## 2024-12-16 ENCOUNTER — APPOINTMENT (OUTPATIENT)
Dept: BARIATRICS | Facility: CLINIC | Age: 79
End: 2024-12-16

## 2025-01-13 ENCOUNTER — RESULT REVIEW (OUTPATIENT)
Age: 80
End: 2025-01-13

## 2025-01-13 ENCOUNTER — APPOINTMENT (OUTPATIENT)
Dept: HEMATOLOGY ONCOLOGY | Facility: CLINIC | Age: 80
End: 2025-01-13

## 2025-01-13 ENCOUNTER — NON-APPOINTMENT (OUTPATIENT)
Age: 80
End: 2025-01-13

## 2025-01-13 VITALS
BODY MASS INDEX: 28.08 KG/M2 | SYSTOLIC BLOOD PRESSURE: 154 MMHG | DIASTOLIC BLOOD PRESSURE: 81 MMHG | RESPIRATION RATE: 17 BRPM | WEIGHT: 183.13 LBS | OXYGEN SATURATION: 97 % | HEART RATE: 79 BPM | TEMPERATURE: 97.6 F | HEIGHT: 67.9 IN

## 2025-01-13 DIAGNOSIS — C23 MALIGNANT NEOPLASM OF GALLBLADDER: ICD-10-CM

## 2025-01-13 LAB
BASOPHILS # BLD AUTO: 0.02 K/UL — SIGNIFICANT CHANGE UP (ref 0–0.2)
BASOPHILS NFR BLD AUTO: 0.5 % — SIGNIFICANT CHANGE UP (ref 0–2)
EOSINOPHIL # BLD AUTO: 0.07 K/UL — SIGNIFICANT CHANGE UP (ref 0–0.5)
EOSINOPHIL NFR BLD AUTO: 1.8 % — SIGNIFICANT CHANGE UP (ref 0–6)
HCT VFR BLD CALC: 42.5 % — SIGNIFICANT CHANGE UP (ref 34.5–45)
HGB BLD-MCNC: 13.5 G/DL — SIGNIFICANT CHANGE UP (ref 11.5–15.5)
IMM GRANULOCYTES NFR BLD AUTO: 0.3 % — SIGNIFICANT CHANGE UP (ref 0–0.9)
LYMPHOCYTES # BLD AUTO: 1.41 K/UL — SIGNIFICANT CHANGE UP (ref 1–3.3)
LYMPHOCYTES # BLD AUTO: 35.6 % — SIGNIFICANT CHANGE UP (ref 13–44)
MCHC RBC-ENTMCNC: 28 PG — SIGNIFICANT CHANGE UP (ref 27–34)
MCHC RBC-ENTMCNC: 31.8 G/DL — LOW (ref 32–36)
MCV RBC AUTO: 88 FL — SIGNIFICANT CHANGE UP (ref 80–100)
MONOCYTES # BLD AUTO: 0.44 K/UL — SIGNIFICANT CHANGE UP (ref 0–0.9)
MONOCYTES NFR BLD AUTO: 11.1 % — SIGNIFICANT CHANGE UP (ref 2–14)
NEUTROPHILS # BLD AUTO: 2.01 K/UL — SIGNIFICANT CHANGE UP (ref 1.8–7.4)
NEUTROPHILS NFR BLD AUTO: 50.7 % — SIGNIFICANT CHANGE UP (ref 43–77)
NRBC # BLD: 0 /100 WBCS — SIGNIFICANT CHANGE UP (ref 0–0)
NRBC BLD-RTO: 0 /100 WBCS — SIGNIFICANT CHANGE UP (ref 0–0)
PLATELET # BLD AUTO: 229 K/UL — SIGNIFICANT CHANGE UP (ref 150–400)
RBC # BLD: 4.83 M/UL — SIGNIFICANT CHANGE UP (ref 3.8–5.2)
RBC # FLD: 17 % — HIGH (ref 10.3–14.5)
WBC # BLD: 3.96 K/UL — SIGNIFICANT CHANGE UP (ref 3.8–10.5)
WBC # FLD AUTO: 3.96 K/UL — SIGNIFICANT CHANGE UP (ref 3.8–10.5)

## 2025-01-13 PROCEDURE — G2211 COMPLEX E/M VISIT ADD ON: CPT

## 2025-01-13 PROCEDURE — 99214 OFFICE O/P EST MOD 30 MIN: CPT

## 2025-01-13 RX ORDER — ONDANSETRON 4 MG/1
4 TABLET ORAL
Qty: 30 | Refills: 0 | Status: ACTIVE | COMMUNITY
Start: 2025-01-13 | End: 1900-01-01

## 2025-01-13 RX ORDER — CAPECITABINE 150 MG/1
150 TABLET, FILM COATED ORAL
Qty: 28 | Refills: 0 | Status: ACTIVE | COMMUNITY
Start: 2025-01-13 | End: 1900-01-01

## 2025-01-13 RX ORDER — CAPECITABINE 500 MG/1
500 TABLET ORAL TWICE DAILY
Qty: 84 | Refills: 0 | Status: ACTIVE | COMMUNITY
Start: 2025-01-13 | End: 1900-01-01

## 2025-01-16 LAB
ALBUMIN SERPL ELPH-MCNC: 4.3 G/DL
ALP BLD-CCNC: 107 U/L
ALT SERPL-CCNC: 43 U/L
ANION GAP SERPL CALC-SCNC: 11 MMOL/L
AST SERPL-CCNC: 41 U/L
BILIRUB SERPL-MCNC: 0.2 MG/DL
BUN SERPL-MCNC: 13 MG/DL
CALCIUM SERPL-MCNC: 8.6 MG/DL
CANCER AG19-9 SERPL-ACNC: <2 U/ML
CHLORIDE SERPL-SCNC: 103 MMOL/L
CO2 SERPL-SCNC: 28 MMOL/L
CREAT SERPL-MCNC: 0.88 MG/DL
EGFR: 66 ML/MIN/1.73M2
GLUCOSE SERPL-MCNC: 95 MG/DL
POTASSIUM SERPL-SCNC: 4.6 MMOL/L
PROT SERPL-MCNC: 7.5 G/DL
SODIUM SERPL-SCNC: 142 MMOL/L

## 2025-02-04 ENCOUNTER — OUTPATIENT (OUTPATIENT)
Dept: OUTPATIENT SERVICES | Facility: HOSPITAL | Age: 80
LOS: 1 days | Discharge: HOME | End: 2025-02-04

## 2025-02-04 DIAGNOSIS — Z98.890 OTHER SPECIFIED POSTPROCEDURAL STATES: Chronic | ICD-10-CM

## 2025-02-04 DIAGNOSIS — C23 MALIGNANT NEOPLASM OF GALLBLADDER: ICD-10-CM

## 2025-02-04 DIAGNOSIS — E89.0 POSTPROCEDURAL HYPOTHYROIDISM: Chronic | ICD-10-CM

## 2025-02-05 ENCOUNTER — RESULT REVIEW (OUTPATIENT)
Age: 80
End: 2025-02-05

## 2025-02-05 ENCOUNTER — APPOINTMENT (OUTPATIENT)
Dept: HEMATOLOGY ONCOLOGY | Facility: CLINIC | Age: 80
End: 2025-02-05
Payer: MEDICARE

## 2025-02-05 VITALS
TEMPERATURE: 98.8 F | HEIGHT: 67.32 IN | DIASTOLIC BLOOD PRESSURE: 81 MMHG | WEIGHT: 182.98 LBS | OXYGEN SATURATION: 97 % | SYSTOLIC BLOOD PRESSURE: 130 MMHG | HEART RATE: 84 BPM | RESPIRATION RATE: 18 BRPM | BODY MASS INDEX: 28.38 KG/M2

## 2025-02-05 DIAGNOSIS — C23 MALIGNANT NEOPLASM OF GALLBLADDER: ICD-10-CM

## 2025-02-05 LAB
BASOPHILS # BLD AUTO: 0.02 K/UL — SIGNIFICANT CHANGE UP (ref 0–0.2)
BASOPHILS NFR BLD AUTO: 0.6 % — SIGNIFICANT CHANGE UP (ref 0–2)
EOSINOPHIL NFR BLD AUTO: 0.6 % — SIGNIFICANT CHANGE UP (ref 0–6)
HCT VFR BLD CALC: 41.5 % — SIGNIFICANT CHANGE UP (ref 34.5–45)
HGB BLD-MCNC: 13.3 G/DL — SIGNIFICANT CHANGE UP (ref 11.5–15.5)
IMM GRANULOCYTES NFR BLD AUTO: 1 % — HIGH (ref 0–0.9)
LYMPHOCYTES # BLD AUTO: 1.35 K/UL — SIGNIFICANT CHANGE UP (ref 1–3.3)
LYMPHOCYTES # BLD AUTO: 43.1 % — SIGNIFICANT CHANGE UP (ref 13–44)
MCHC RBC-ENTMCNC: 28.5 PG — SIGNIFICANT CHANGE UP (ref 27–34)
MCHC RBC-ENTMCNC: 32 G/DL — SIGNIFICANT CHANGE UP (ref 32–36)
MCV RBC AUTO: 88.9 FL — SIGNIFICANT CHANGE UP (ref 80–100)
MONOCYTES # BLD AUTO: 0.33 K/UL — SIGNIFICANT CHANGE UP (ref 0–0.9)
MONOCYTES NFR BLD AUTO: 10.5 % — SIGNIFICANT CHANGE UP (ref 2–14)
NEUTROPHILS # BLD AUTO: 1.38 K/UL — LOW (ref 1.8–7.4)
NEUTROPHILS NFR BLD AUTO: 44.2 % — SIGNIFICANT CHANGE UP (ref 43–77)
NRBC # BLD: 0 /100 WBCS — SIGNIFICANT CHANGE UP (ref 0–0)
NRBC BLD-RTO: 0 /100 WBCS — SIGNIFICANT CHANGE UP (ref 0–0)
PLATELET # BLD AUTO: 209 K/UL — SIGNIFICANT CHANGE UP (ref 150–400)
RBC # BLD: 4.67 M/UL — SIGNIFICANT CHANGE UP (ref 3.8–5.2)
RBC # FLD: 16.4 % — HIGH (ref 10.3–14.5)
WBC # BLD: 3.13 K/UL — LOW (ref 3.8–10.5)
WBC # FLD AUTO: 3.13 K/UL — LOW (ref 3.8–10.5)

## 2025-02-05 PROCEDURE — G2211 COMPLEX E/M VISIT ADD ON: CPT

## 2025-02-05 PROCEDURE — 99214 OFFICE O/P EST MOD 30 MIN: CPT

## 2025-02-06 LAB
ALBUMIN SERPL ELPH-MCNC: 4.5 G/DL
ALP BLD-CCNC: 98 U/L
ALT SERPL-CCNC: 12 U/L
ANION GAP SERPL CALC-SCNC: 12 MMOL/L
AST SERPL-CCNC: 22 U/L
BILIRUB SERPL-MCNC: 0.2 MG/DL
BUN SERPL-MCNC: 13 MG/DL
CALCIUM SERPL-MCNC: 9.2 MG/DL
CHLORIDE SERPL-SCNC: 107 MMOL/L
CO2 SERPL-SCNC: 26 MMOL/L
CREAT SERPL-MCNC: 0.76 MG/DL
EGFR: 79 ML/MIN/1.73M2
GLUCOSE SERPL-MCNC: 105 MG/DL
POTASSIUM SERPL-SCNC: 4.3 MMOL/L
PROT SERPL-MCNC: 7.5 G/DL
SODIUM SERPL-SCNC: 145 MMOL/L

## 2025-02-19 ENCOUNTER — OUTPATIENT (OUTPATIENT)
Dept: OUTPATIENT SERVICES | Facility: HOSPITAL | Age: 80
LOS: 1 days | End: 2025-02-19
Payer: MEDICARE

## 2025-02-19 ENCOUNTER — APPOINTMENT (OUTPATIENT)
Dept: CT IMAGING | Facility: IMAGING CENTER | Age: 80
End: 2025-02-19
Payer: MEDICARE

## 2025-02-19 DIAGNOSIS — E89.0 POSTPROCEDURAL HYPOTHYROIDISM: Chronic | ICD-10-CM

## 2025-02-19 DIAGNOSIS — Z98.49 CATARACT EXTRACTION STATUS, UNSPECIFIED EYE: Chronic | ICD-10-CM

## 2025-02-19 DIAGNOSIS — Z98.890 OTHER SPECIFIED POSTPROCEDURAL STATES: Chronic | ICD-10-CM

## 2025-02-19 DIAGNOSIS — C23 MALIGNANT NEOPLASM OF GALLBLADDER: ICD-10-CM

## 2025-02-19 PROCEDURE — 71260 CT THORAX DX C+: CPT

## 2025-02-19 PROCEDURE — 74177 CT ABD & PELVIS W/CONTRAST: CPT | Mod: 26

## 2025-02-19 PROCEDURE — 71260 CT THORAX DX C+: CPT | Mod: 26

## 2025-02-19 PROCEDURE — 74177 CT ABD & PELVIS W/CONTRAST: CPT

## 2025-02-26 ENCOUNTER — APPOINTMENT (OUTPATIENT)
Dept: HEMATOLOGY ONCOLOGY | Facility: CLINIC | Age: 80
End: 2025-02-26
Payer: MEDICARE

## 2025-02-26 ENCOUNTER — RESULT REVIEW (OUTPATIENT)
Age: 80
End: 2025-02-26

## 2025-02-26 VITALS
TEMPERATURE: 97.8 F | DIASTOLIC BLOOD PRESSURE: 80 MMHG | BODY MASS INDEX: 28.25 KG/M2 | HEART RATE: 77 BPM | HEIGHT: 68 IN | SYSTOLIC BLOOD PRESSURE: 136 MMHG | RESPIRATION RATE: 18 BRPM | OXYGEN SATURATION: 96 % | WEIGHT: 186.39 LBS

## 2025-02-26 DIAGNOSIS — C23 MALIGNANT NEOPLASM OF GALLBLADDER: ICD-10-CM

## 2025-02-26 DIAGNOSIS — H40.9 UNSPECIFIED GLAUCOMA: ICD-10-CM

## 2025-02-26 LAB
BASOPHILS # BLD AUTO: 0.01 K/UL — SIGNIFICANT CHANGE UP (ref 0–0.2)
EOSINOPHIL # BLD AUTO: 0.04 K/UL — SIGNIFICANT CHANGE UP (ref 0–0.5)
EOSINOPHIL NFR BLD AUTO: 1.1 % — SIGNIFICANT CHANGE UP (ref 0–6)
HCT VFR BLD CALC: 37.1 % — SIGNIFICANT CHANGE UP (ref 34.5–45)
HGB BLD-MCNC: 12.2 G/DL — SIGNIFICANT CHANGE UP (ref 11.5–15.5)
LYMPHOCYTES # BLD AUTO: 1.47 K/UL — SIGNIFICANT CHANGE UP (ref 1–3.3)
LYMPHOCYTES # BLD AUTO: 40.5 % — SIGNIFICANT CHANGE UP (ref 13–44)
MCHC RBC-ENTMCNC: 30 PG — SIGNIFICANT CHANGE UP (ref 27–34)
MCHC RBC-ENTMCNC: 32.9 G/DL — SIGNIFICANT CHANGE UP (ref 32–36)
MCV RBC AUTO: 91.4 FL — SIGNIFICANT CHANGE UP (ref 80–100)
MONOCYTES # BLD AUTO: 0.45 K/UL — SIGNIFICANT CHANGE UP (ref 0–0.9)
MONOCYTES NFR BLD AUTO: 12.4 % — SIGNIFICANT CHANGE UP (ref 2–14)
NEUTROPHILS # BLD AUTO: 1.57 K/UL — LOW (ref 1.8–7.4)
NEUTROPHILS NFR BLD AUTO: 43.2 % — SIGNIFICANT CHANGE UP (ref 43–77)
NRBC BLD AUTO-RTO: 0 /100 WBCS — SIGNIFICANT CHANGE UP (ref 0–0)
RBC # BLD: 4.06 M/UL — SIGNIFICANT CHANGE UP (ref 3.8–5.2)
RBC # FLD: 18.1 % — HIGH (ref 10.3–14.5)
WBC # BLD: 3.63 K/UL — LOW (ref 3.8–10.5)
WBC # FLD AUTO: 3.63 K/UL — LOW (ref 3.8–10.5)

## 2025-02-26 PROCEDURE — 99214 OFFICE O/P EST MOD 30 MIN: CPT

## 2025-02-26 RX ORDER — ZINC CHLORIDE/BENZYL ALCOHOL 0.15-0.27%
20-0.26 MOUTHWASH MUCOUS MEMBRANE
Qty: 1 | Refills: 0 | Status: ACTIVE | COMMUNITY
Start: 2025-02-26 | End: 1900-01-01

## 2025-02-26 RX ORDER — DORZOLAMIDE HYDROCHLORIDE TIMOLOL MALEATE 20; 5 MG/ML; MG/ML
2-0.5 SOLUTION/ DROPS OPHTHALMIC TWICE DAILY
Refills: 0 | Status: ACTIVE | COMMUNITY
Start: 2025-02-26

## 2025-02-26 RX ORDER — BETAMETHASONE DIPROPIONATE 0.5 MG/G
0.05 CREAM TOPICAL TWICE DAILY
Qty: 1 | Refills: 0 | Status: ACTIVE | COMMUNITY
Start: 2025-02-26 | End: 1900-01-01

## 2025-02-27 ENCOUNTER — NON-APPOINTMENT (OUTPATIENT)
Age: 80
End: 2025-02-27

## 2025-02-27 LAB
ALBUMIN SERPL ELPH-MCNC: 4.2 G/DL
ALP BLD-CCNC: 95 U/L
ALT SERPL-CCNC: 13 U/L
ANION GAP SERPL CALC-SCNC: 11 MMOL/L
AST SERPL-CCNC: 24 U/L
BILIRUB SERPL-MCNC: 0.3 MG/DL
BUN SERPL-MCNC: 14 MG/DL
CALCIUM SERPL-MCNC: 9 MG/DL
CHLORIDE SERPL-SCNC: 107 MMOL/L
CO2 SERPL-SCNC: 25 MMOL/L
CREAT SERPL-MCNC: 0.79 MG/DL
EGFR: 76 ML/MIN/1.73M2
FERRITIN SERPL-MCNC: 144 NG/ML
GLUCOSE SERPL-MCNC: 95 MG/DL
IRON SATN MFR SERPL: 22 %
IRON SERPL-MCNC: 89 UG/DL
POTASSIUM SERPL-SCNC: 4.3 MMOL/L
PROT SERPL-MCNC: 6.8 G/DL
SODIUM SERPL-SCNC: 143 MMOL/L
TIBC SERPL-MCNC: 401 UG/DL
UIBC SERPL-MCNC: 312 UG/DL

## 2025-03-19 ENCOUNTER — RESULT REVIEW (OUTPATIENT)
Age: 80
End: 2025-03-19

## 2025-03-19 ENCOUNTER — APPOINTMENT (OUTPATIENT)
Dept: HEMATOLOGY ONCOLOGY | Facility: CLINIC | Age: 80
End: 2025-03-19

## 2025-03-19 VITALS
BODY MASS INDEX: 28.49 KG/M2 | TEMPERATURE: 97 F | OXYGEN SATURATION: 97 % | HEART RATE: 82 BPM | DIASTOLIC BLOOD PRESSURE: 74 MMHG | RESPIRATION RATE: 18 BRPM | WEIGHT: 187.39 LBS | SYSTOLIC BLOOD PRESSURE: 123 MMHG

## 2025-03-19 DIAGNOSIS — C23 MALIGNANT NEOPLASM OF GALLBLADDER: ICD-10-CM

## 2025-03-19 LAB
BASOPHILS # BLD AUTO: 0.03 K/UL — SIGNIFICANT CHANGE UP (ref 0–0.2)
BASOPHILS NFR BLD AUTO: 0.8 % — SIGNIFICANT CHANGE UP (ref 0–2)
EOSINOPHIL # BLD AUTO: 0.06 K/UL — SIGNIFICANT CHANGE UP (ref 0–0.5)
EOSINOPHIL NFR BLD AUTO: 1.5 % — SIGNIFICANT CHANGE UP (ref 0–6)
HCT VFR BLD CALC: 39.3 % — SIGNIFICANT CHANGE UP (ref 34.5–45)
HGB BLD-MCNC: 12.9 G/DL — SIGNIFICANT CHANGE UP (ref 11.5–15.5)
IMM GRANULOCYTES NFR BLD AUTO: 1 % — HIGH (ref 0–0.9)
LYMPHOCYTES # BLD AUTO: 1.07 K/UL — SIGNIFICANT CHANGE UP (ref 1–3.3)
LYMPHOCYTES # BLD AUTO: 27.6 % — SIGNIFICANT CHANGE UP (ref 13–44)
MCHC RBC-ENTMCNC: 30.9 PG — SIGNIFICANT CHANGE UP (ref 27–34)
MCHC RBC-ENTMCNC: 32.8 G/DL — SIGNIFICANT CHANGE UP (ref 32–36)
MCV RBC AUTO: 94.2 FL — SIGNIFICANT CHANGE UP (ref 80–100)
MONOCYTES # BLD AUTO: 0.41 K/UL — SIGNIFICANT CHANGE UP (ref 0–0.9)
MONOCYTES NFR BLD AUTO: 10.6 % — SIGNIFICANT CHANGE UP (ref 2–14)
NEUTROPHILS # BLD AUTO: 2.27 K/UL — SIGNIFICANT CHANGE UP (ref 1.8–7.4)
NEUTROPHILS NFR BLD AUTO: 58.5 % — SIGNIFICANT CHANGE UP (ref 43–77)
NRBC BLD AUTO-RTO: 0 /100 WBCS — SIGNIFICANT CHANGE UP (ref 0–0)
PLATELET # BLD AUTO: 187 K/UL — SIGNIFICANT CHANGE UP (ref 150–400)
RBC # BLD: 4.17 M/UL — SIGNIFICANT CHANGE UP (ref 3.8–5.2)
RBC # FLD: 20.2 % — HIGH (ref 10.3–14.5)
WBC # BLD: 3.88 K/UL — SIGNIFICANT CHANGE UP (ref 3.8–10.5)
WBC # FLD AUTO: 3.88 K/UL — SIGNIFICANT CHANGE UP (ref 3.8–10.5)

## 2025-03-19 PROCEDURE — 99214 OFFICE O/P EST MOD 30 MIN: CPT

## 2025-03-19 PROCEDURE — G2211 COMPLEX E/M VISIT ADD ON: CPT

## 2025-03-20 LAB
ALBUMIN SERPL ELPH-MCNC: 4.7 G/DL
ALP BLD-CCNC: 119 U/L
ALT SERPL-CCNC: 16 U/L
ANION GAP SERPL CALC-SCNC: 13 MMOL/L
AST SERPL-CCNC: 21 U/L
BILIRUB SERPL-MCNC: 0.4 MG/DL
BUN SERPL-MCNC: 20 MG/DL
CALCIUM SERPL-MCNC: 9 MG/DL
CHLORIDE SERPL-SCNC: 103 MMOL/L
CO2 SERPL-SCNC: 27 MMOL/L
CREAT SERPL-MCNC: 0.9 MG/DL
EGFRCR SERPLBLD CKD-EPI 2021: 65 ML/MIN/1.73M2
GLUCOSE SERPL-MCNC: 90 MG/DL
POTASSIUM SERPL-SCNC: 4.4 MMOL/L
PROT SERPL-MCNC: 7.5 G/DL
SODIUM SERPL-SCNC: 143 MMOL/L

## 2025-04-02 ENCOUNTER — OUTPATIENT (OUTPATIENT)
Dept: OUTPATIENT SERVICES | Facility: HOSPITAL | Age: 80
LOS: 1 days | Discharge: HOME | End: 2025-04-02

## 2025-04-02 DIAGNOSIS — Z98.49 CATARACT EXTRACTION STATUS, UNSPECIFIED EYE: Chronic | ICD-10-CM

## 2025-04-02 DIAGNOSIS — E89.0 POSTPROCEDURAL HYPOTHYROIDISM: Chronic | ICD-10-CM

## 2025-04-02 DIAGNOSIS — C23 MALIGNANT NEOPLASM OF GALLBLADDER: ICD-10-CM

## 2025-04-02 DIAGNOSIS — Z98.890 OTHER SPECIFIED POSTPROCEDURAL STATES: Chronic | ICD-10-CM

## 2025-04-07 ENCOUNTER — APPOINTMENT (OUTPATIENT)
Dept: HEMATOLOGY ONCOLOGY | Facility: CLINIC | Age: 80
End: 2025-04-07
Payer: MEDICARE

## 2025-04-07 ENCOUNTER — RESULT REVIEW (OUTPATIENT)
Age: 80
End: 2025-04-07

## 2025-04-07 VITALS
WEIGHT: 187.39 LBS | SYSTOLIC BLOOD PRESSURE: 143 MMHG | RESPIRATION RATE: 16 BRPM | HEART RATE: 75 BPM | DIASTOLIC BLOOD PRESSURE: 79 MMHG | OXYGEN SATURATION: 99 % | TEMPERATURE: 97.5 F | BODY MASS INDEX: 28.49 KG/M2

## 2025-04-07 DIAGNOSIS — C23 MALIGNANT NEOPLASM OF GALLBLADDER: ICD-10-CM

## 2025-04-07 DIAGNOSIS — H40.9 UNSPECIFIED GLAUCOMA: ICD-10-CM

## 2025-04-07 DIAGNOSIS — J30.9 ALLERGIC RHINITIS, UNSPECIFIED: ICD-10-CM

## 2025-04-07 LAB
BASOPHILS # BLD AUTO: 0.02 K/UL — SIGNIFICANT CHANGE UP (ref 0–0.2)
BASOPHILS NFR BLD AUTO: 0.8 % — SIGNIFICANT CHANGE UP (ref 0–2)
EOSINOPHIL # BLD AUTO: 0.04 K/UL — SIGNIFICANT CHANGE UP (ref 0–0.5)
EOSINOPHIL NFR BLD AUTO: 1.5 % — SIGNIFICANT CHANGE UP (ref 0–6)
HCT VFR BLD CALC: 36.8 % — SIGNIFICANT CHANGE UP (ref 34.5–45)
HGB BLD-MCNC: 12.1 G/DL — SIGNIFICANT CHANGE UP (ref 11.5–15.5)
IMM GRANULOCYTES NFR BLD AUTO: 0.4 % — SIGNIFICANT CHANGE UP (ref 0–0.9)
LYMPHOCYTES # BLD AUTO: 1.22 K/UL — SIGNIFICANT CHANGE UP (ref 1–3.3)
LYMPHOCYTES # BLD AUTO: 46.7 % — HIGH (ref 13–44)
MCHC RBC-ENTMCNC: 32.4 PG — SIGNIFICANT CHANGE UP (ref 27–34)
MCHC RBC-ENTMCNC: 32.9 G/DL — SIGNIFICANT CHANGE UP (ref 32–36)
MCV RBC AUTO: 98.7 FL — SIGNIFICANT CHANGE UP (ref 80–100)
MONOCYTES # BLD AUTO: 0.29 K/UL — SIGNIFICANT CHANGE UP (ref 0–0.9)
MONOCYTES NFR BLD AUTO: 11.1 % — SIGNIFICANT CHANGE UP (ref 2–14)
NEUTROPHILS # BLD AUTO: 1.03 K/UL — LOW (ref 1.8–7.4)
NEUTROPHILS NFR BLD AUTO: 39.5 % — LOW (ref 43–77)
NRBC BLD AUTO-RTO: 0 /100 WBCS — SIGNIFICANT CHANGE UP (ref 0–0)
PLATELET # BLD AUTO: 185 K/UL — SIGNIFICANT CHANGE UP (ref 150–400)
RBC # BLD: 3.73 M/UL — LOW (ref 3.8–5.2)
RBC # FLD: 20.5 % — HIGH (ref 10.3–14.5)
WBC # BLD: 2.61 K/UL — LOW (ref 3.8–10.5)
WBC # FLD AUTO: 2.61 K/UL — LOW (ref 3.8–10.5)

## 2025-04-07 PROCEDURE — G2211 COMPLEX E/M VISIT ADD ON: CPT

## 2025-04-07 PROCEDURE — 99214 OFFICE O/P EST MOD 30 MIN: CPT

## 2025-04-09 LAB
ALBUMIN SERPL ELPH-MCNC: 4.6 G/DL
ALP BLD-CCNC: 90 U/L
ALT SERPL-CCNC: 23 U/L
ANION GAP SERPL CALC-SCNC: 8 MMOL/L
AST SERPL-CCNC: 30 U/L
BILIRUB SERPL-MCNC: 0.5 MG/DL
BUN SERPL-MCNC: 16 MG/DL
CALCIUM SERPL-MCNC: 8.9 MG/DL
CHLORIDE SERPL-SCNC: 107 MMOL/L
CO2 SERPL-SCNC: 29 MMOL/L
CREAT SERPL-MCNC: 0.87 MG/DL
EGFRCR SERPLBLD CKD-EPI 2021: 67 ML/MIN/1.73M2
GLUCOSE SERPL-MCNC: 103 MG/DL
POTASSIUM SERPL-SCNC: 4.4 MMOL/L
PROT SERPL-MCNC: 7.1 G/DL
SODIUM SERPL-SCNC: 143 MMOL/L

## 2025-04-20 PROBLEM — L27.1 HAND FOOT SYNDROME: Status: ACTIVE | Noted: 2025-04-20

## 2025-04-28 ENCOUNTER — RESULT REVIEW (OUTPATIENT)
Age: 80
End: 2025-04-28

## 2025-04-28 ENCOUNTER — APPOINTMENT (OUTPATIENT)
Dept: HEMATOLOGY ONCOLOGY | Facility: CLINIC | Age: 80
End: 2025-04-28
Payer: MEDICARE

## 2025-04-28 VITALS
RESPIRATION RATE: 16 BRPM | BODY MASS INDEX: 28.56 KG/M2 | OXYGEN SATURATION: 97 % | SYSTOLIC BLOOD PRESSURE: 131 MMHG | DIASTOLIC BLOOD PRESSURE: 85 MMHG | TEMPERATURE: 97.5 F | WEIGHT: 187.83 LBS | HEART RATE: 78 BPM

## 2025-04-28 DIAGNOSIS — C23 MALIGNANT NEOPLASM OF GALLBLADDER: ICD-10-CM

## 2025-04-28 DIAGNOSIS — J30.9 ALLERGIC RHINITIS, UNSPECIFIED: ICD-10-CM

## 2025-04-28 DIAGNOSIS — H40.9 UNSPECIFIED GLAUCOMA: ICD-10-CM

## 2025-04-28 LAB
ALBUMIN SERPL ELPH-MCNC: 4.6 G/DL
ALP BLD-CCNC: 80 U/L
ALT SERPL-CCNC: 18 U/L
ANION GAP SERPL CALC-SCNC: 11 MMOL/L
AST SERPL-CCNC: 33 U/L
BASOPHILS # BLD AUTO: 0.02 K/UL — SIGNIFICANT CHANGE UP (ref 0–0.2)
BASOPHILS NFR BLD AUTO: 0.7 % — SIGNIFICANT CHANGE UP (ref 0–2)
BILIRUB SERPL-MCNC: 0.6 MG/DL
BUN SERPL-MCNC: 19 MG/DL
CALCIUM SERPL-MCNC: 9.6 MG/DL
CHLORIDE SERPL-SCNC: 106 MMOL/L
CO2 SERPL-SCNC: 25 MMOL/L
CREAT SERPL-MCNC: 0.93 MG/DL
EGFRCR SERPLBLD CKD-EPI 2021: 62 ML/MIN/1.73M2
EOSINOPHIL # BLD AUTO: 0.04 K/UL — SIGNIFICANT CHANGE UP (ref 0–0.5)
EOSINOPHIL NFR BLD AUTO: 1.5 % — SIGNIFICANT CHANGE UP (ref 0–6)
GLUCOSE SERPL-MCNC: 109 MG/DL
HCT VFR BLD CALC: 38.7 % — SIGNIFICANT CHANGE UP (ref 34.5–45)
HGB BLD-MCNC: 13 G/DL — SIGNIFICANT CHANGE UP (ref 11.5–15.5)
IMM GRANULOCYTES NFR BLD AUTO: 0.4 % — SIGNIFICANT CHANGE UP (ref 0–0.9)
LYMPHOCYTES # BLD AUTO: 1.29 K/UL — SIGNIFICANT CHANGE UP (ref 1–3.3)
LYMPHOCYTES # BLD AUTO: 47.8 % — HIGH (ref 13–44)
MCHC RBC-ENTMCNC: 33.6 G/DL — SIGNIFICANT CHANGE UP (ref 32–36)
MCHC RBC-ENTMCNC: 34.6 PG — HIGH (ref 27–34)
MCV RBC AUTO: 102.9 FL — HIGH (ref 80–100)
MONOCYTES # BLD AUTO: 0.33 K/UL — SIGNIFICANT CHANGE UP (ref 0–0.9)
MONOCYTES NFR BLD AUTO: 12.2 % — SIGNIFICANT CHANGE UP (ref 2–14)
NEUTROPHILS # BLD AUTO: 1.01 K/UL — LOW (ref 1.8–7.4)
NEUTROPHILS NFR BLD AUTO: 37.4 % — LOW (ref 43–77)
NRBC BLD AUTO-RTO: 0 /100 WBCS — SIGNIFICANT CHANGE UP (ref 0–0)
PLATELET # BLD AUTO: 182 K/UL — SIGNIFICANT CHANGE UP (ref 150–400)
POTASSIUM SERPL-SCNC: 4.9 MMOL/L
PROT SERPL-MCNC: 7.3 G/DL
RBC # BLD: 3.76 M/UL — LOW (ref 3.8–5.2)
RBC # FLD: 19.8 % — HIGH (ref 10.3–14.5)
SODIUM SERPL-SCNC: 142 MMOL/L
WBC # BLD: 2.7 K/UL — LOW (ref 3.8–10.5)
WBC # FLD AUTO: 2.7 K/UL — LOW (ref 3.8–10.5)

## 2025-04-28 PROCEDURE — G2211 COMPLEX E/M VISIT ADD ON: CPT

## 2025-04-28 PROCEDURE — 99214 OFFICE O/P EST MOD 30 MIN: CPT

## 2025-04-28 RX ORDER — DIPHENHYDRAMINE HYDROCHLORIDE AND LIDOCAINE HYDROCHLORIDE AND ALUMINUM HYDROXIDE AND MAGNESIUM HYDRO
KIT
Qty: 1 | Refills: 1 | Status: ACTIVE | COMMUNITY
Start: 2025-04-28 | End: 1900-01-01

## 2025-05-13 ENCOUNTER — APPOINTMENT (OUTPATIENT)
Dept: SURGICAL ONCOLOGY | Facility: CLINIC | Age: 80
End: 2025-05-13
Payer: MEDICARE

## 2025-05-13 ENCOUNTER — NON-APPOINTMENT (OUTPATIENT)
Age: 80
End: 2025-05-13

## 2025-05-13 VITALS
SYSTOLIC BLOOD PRESSURE: 134 MMHG | HEIGHT: 68 IN | DIASTOLIC BLOOD PRESSURE: 80 MMHG | OXYGEN SATURATION: 96 % | BODY MASS INDEX: 28.49 KG/M2 | HEART RATE: 78 BPM | WEIGHT: 188 LBS

## 2025-05-13 PROCEDURE — 99214 OFFICE O/P EST MOD 30 MIN: CPT

## 2025-05-19 ENCOUNTER — RESULT REVIEW (OUTPATIENT)
Age: 80
End: 2025-05-19

## 2025-05-19 ENCOUNTER — APPOINTMENT (OUTPATIENT)
Dept: HEMATOLOGY ONCOLOGY | Facility: CLINIC | Age: 80
End: 2025-05-19
Payer: MEDICARE

## 2025-05-19 VITALS
WEIGHT: 188.37 LBS | SYSTOLIC BLOOD PRESSURE: 147 MMHG | OXYGEN SATURATION: 96 % | RESPIRATION RATE: 17 BRPM | DIASTOLIC BLOOD PRESSURE: 89 MMHG | HEART RATE: 81 BPM | TEMPERATURE: 96.7 F | BODY MASS INDEX: 28.64 KG/M2

## 2025-05-19 DIAGNOSIS — C23 MALIGNANT NEOPLASM OF GALLBLADDER: ICD-10-CM

## 2025-05-19 LAB
ALBUMIN SERPL ELPH-MCNC: 4.5 G/DL
ALP BLD-CCNC: 91 U/L
ALT SERPL-CCNC: 19 U/L
ANION GAP SERPL CALC-SCNC: 13 MMOL/L
AST SERPL-CCNC: 31 U/L
BASOPHILS # BLD AUTO: 0.02 K/UL — SIGNIFICANT CHANGE UP (ref 0–0.2)
BASOPHILS NFR BLD AUTO: 0.7 % — SIGNIFICANT CHANGE UP (ref 0–2)
BILIRUB SERPL-MCNC: 0.5 MG/DL
BUN SERPL-MCNC: 16 MG/DL
CALCIUM SERPL-MCNC: 9.2 MG/DL
CHLORIDE SERPL-SCNC: 104 MMOL/L
CO2 SERPL-SCNC: 24 MMOL/L
CREAT SERPL-MCNC: 0.84 MG/DL
EGFRCR SERPLBLD CKD-EPI 2021: 70 ML/MIN/1.73M2
EOSINOPHIL # BLD AUTO: 0.05 K/UL — SIGNIFICANT CHANGE UP (ref 0–0.5)
EOSINOPHIL NFR BLD AUTO: 1.8 % — SIGNIFICANT CHANGE UP (ref 0–6)
GLUCOSE SERPL-MCNC: 102 MG/DL
HCT VFR BLD CALC: 37.5 % — SIGNIFICANT CHANGE UP (ref 34.5–45)
HGB BLD-MCNC: 12.8 G/DL — SIGNIFICANT CHANGE UP (ref 11.5–15.5)
IMM GRANULOCYTES NFR BLD AUTO: 0.7 % — SIGNIFICANT CHANGE UP (ref 0–0.9)
LYMPHOCYTES # BLD AUTO: 1.33 K/UL — SIGNIFICANT CHANGE UP (ref 1–3.3)
LYMPHOCYTES # BLD AUTO: 48.5 % — HIGH (ref 13–44)
MCHC RBC-ENTMCNC: 34.1 G/DL — SIGNIFICANT CHANGE UP (ref 32–36)
MCHC RBC-ENTMCNC: 35.6 PG — HIGH (ref 27–34)
MCV RBC AUTO: 104.2 FL — HIGH (ref 80–100)
MONOCYTES # BLD AUTO: 0.29 K/UL — SIGNIFICANT CHANGE UP (ref 0–0.9)
MONOCYTES NFR BLD AUTO: 10.6 % — SIGNIFICANT CHANGE UP (ref 2–14)
NEUTROPHILS # BLD AUTO: 1.03 K/UL — LOW (ref 1.8–7.4)
NEUTROPHILS NFR BLD AUTO: 37.7 % — LOW (ref 43–77)
NRBC BLD AUTO-RTO: 0 /100 WBCS — SIGNIFICANT CHANGE UP (ref 0–0)
PLATELET # BLD AUTO: 172 K/UL — SIGNIFICANT CHANGE UP (ref 150–400)
POTASSIUM SERPL-SCNC: 4.1 MMOL/L
PROT SERPL-MCNC: 7.2 G/DL
RBC # BLD: 3.6 M/UL — LOW (ref 3.8–5.2)
RBC # FLD: 17.2 % — HIGH (ref 10.3–14.5)
SODIUM SERPL-SCNC: 141 MMOL/L
WBC # BLD: 2.74 K/UL — LOW (ref 3.8–10.5)
WBC # FLD AUTO: 2.74 K/UL — LOW (ref 3.8–10.5)

## 2025-05-19 PROCEDURE — G2211 COMPLEX E/M VISIT ADD ON: CPT

## 2025-05-19 PROCEDURE — 99214 OFFICE O/P EST MOD 30 MIN: CPT

## 2025-06-06 ENCOUNTER — OUTPATIENT (OUTPATIENT)
Dept: OUTPATIENT SERVICES | Facility: HOSPITAL | Age: 80
LOS: 1 days | Discharge: HOME | End: 2025-06-06

## 2025-06-06 DIAGNOSIS — Z98.49 CATARACT EXTRACTION STATUS, UNSPECIFIED EYE: Chronic | ICD-10-CM

## 2025-06-06 DIAGNOSIS — E89.0 POSTPROCEDURAL HYPOTHYROIDISM: Chronic | ICD-10-CM

## 2025-06-06 DIAGNOSIS — C23 MALIGNANT NEOPLASM OF GALLBLADDER: ICD-10-CM

## 2025-06-06 DIAGNOSIS — Z98.890 OTHER SPECIFIED POSTPROCEDURAL STATES: Chronic | ICD-10-CM

## 2025-06-09 ENCOUNTER — RESULT REVIEW (OUTPATIENT)
Age: 80
End: 2025-06-09

## 2025-06-09 ENCOUNTER — NON-APPOINTMENT (OUTPATIENT)
Age: 80
End: 2025-06-09

## 2025-06-09 ENCOUNTER — APPOINTMENT (OUTPATIENT)
Dept: HEMATOLOGY ONCOLOGY | Facility: CLINIC | Age: 80
End: 2025-06-09
Payer: MEDICARE

## 2025-06-09 VITALS
OXYGEN SATURATION: 98 % | RESPIRATION RATE: 17 BRPM | TEMPERATURE: 97.7 F | DIASTOLIC BLOOD PRESSURE: 83 MMHG | SYSTOLIC BLOOD PRESSURE: 139 MMHG | WEIGHT: 188.93 LBS | BODY MASS INDEX: 28.63 KG/M2 | HEIGHT: 68 IN | HEART RATE: 74 BPM

## 2025-06-09 LAB
ALBUMIN SERPL ELPH-MCNC: 4.6 G/DL
ALP BLD-CCNC: 90 U/L
ALT SERPL-CCNC: 18 U/L
ANION GAP SERPL CALC-SCNC: 14 MMOL/L
AST SERPL-CCNC: 27 U/L
BASOPHILS # BLD AUTO: 0.02 K/UL — SIGNIFICANT CHANGE UP (ref 0–0.2)
BASOPHILS NFR BLD AUTO: 0.7 % — SIGNIFICANT CHANGE UP (ref 0–2)
BILIRUB SERPL-MCNC: 0.5 MG/DL
BUN SERPL-MCNC: 15 MG/DL
CALCIUM SERPL-MCNC: 9 MG/DL
CHLORIDE SERPL-SCNC: 105 MMOL/L
CO2 SERPL-SCNC: 24 MMOL/L
CREAT SERPL-MCNC: 0.89 MG/DL
EGFRCR SERPLBLD CKD-EPI 2021: 66 ML/MIN/1.73M2
EOSINOPHIL # BLD AUTO: 0.05 K/UL — SIGNIFICANT CHANGE UP (ref 0–0.5)
EOSINOPHIL NFR BLD AUTO: 1.8 % — SIGNIFICANT CHANGE UP (ref 0–6)
GLUCOSE SERPL-MCNC: 105 MG/DL
HCT VFR BLD CALC: 37.4 % — SIGNIFICANT CHANGE UP (ref 34.5–45)
HGB BLD-MCNC: 12.4 G/DL — SIGNIFICANT CHANGE UP (ref 11.5–15.5)
IMM GRANULOCYTES NFR BLD AUTO: 0.7 % — SIGNIFICANT CHANGE UP (ref 0–0.9)
LYMPHOCYTES # BLD AUTO: 1.22 K/UL — SIGNIFICANT CHANGE UP (ref 1–3.3)
LYMPHOCYTES # BLD AUTO: 44.5 % — HIGH (ref 13–44)
MCHC RBC-ENTMCNC: 33.2 G/DL — SIGNIFICANT CHANGE UP (ref 32–36)
MCHC RBC-ENTMCNC: 35.4 PG — HIGH (ref 27–34)
MCV RBC AUTO: 106.9 FL — HIGH (ref 80–100)
MONOCYTES # BLD AUTO: 0.29 K/UL — SIGNIFICANT CHANGE UP (ref 0–0.9)
MONOCYTES NFR BLD AUTO: 10.6 % — SIGNIFICANT CHANGE UP (ref 2–14)
NEUTROPHILS # BLD AUTO: 1.14 K/UL — LOW (ref 1.8–7.4)
NEUTROPHILS NFR BLD AUTO: 41.7 % — LOW (ref 43–77)
NRBC BLD AUTO-RTO: 0 /100 WBCS — SIGNIFICANT CHANGE UP (ref 0–0)
PLATELET # BLD AUTO: 187 K/UL — SIGNIFICANT CHANGE UP (ref 150–400)
POTASSIUM SERPL-SCNC: 4.7 MMOL/L
PROT SERPL-MCNC: 7.2 G/DL
RBC # BLD: 3.5 M/UL — LOW (ref 3.8–5.2)
RBC # FLD: 15.7 % — HIGH (ref 10.3–14.5)
SODIUM SERPL-SCNC: 143 MMOL/L
WBC # BLD: 2.74 K/UL — LOW (ref 3.8–10.5)
WBC # FLD AUTO: 2.74 K/UL — LOW (ref 3.8–10.5)

## 2025-06-09 PROCEDURE — 99214 OFFICE O/P EST MOD 30 MIN: CPT

## 2025-06-09 RX ORDER — TRIAMCINOLONE ACETONIDE 0.25 MG/G
0.03 CREAM TOPICAL
Qty: 1 | Refills: 0 | Status: ACTIVE | COMMUNITY
Start: 2025-06-09 | End: 1900-01-01

## 2025-06-30 ENCOUNTER — APPOINTMENT (OUTPATIENT)
Dept: CT IMAGING | Facility: IMAGING CENTER | Age: 80
End: 2025-06-30
Payer: MEDICARE

## 2025-06-30 ENCOUNTER — OUTPATIENT (OUTPATIENT)
Dept: OUTPATIENT SERVICES | Facility: HOSPITAL | Age: 80
LOS: 1 days | End: 2025-06-30
Payer: MEDICARE

## 2025-06-30 DIAGNOSIS — Z98.49 CATARACT EXTRACTION STATUS, UNSPECIFIED EYE: Chronic | ICD-10-CM

## 2025-06-30 DIAGNOSIS — E89.0 POSTPROCEDURAL HYPOTHYROIDISM: Chronic | ICD-10-CM

## 2025-06-30 DIAGNOSIS — Z98.890 OTHER SPECIFIED POSTPROCEDURAL STATES: Chronic | ICD-10-CM

## 2025-06-30 DIAGNOSIS — C23 MALIGNANT NEOPLASM OF GALLBLADDER: ICD-10-CM

## 2025-06-30 PROCEDURE — 71260 CT THORAX DX C+: CPT | Mod: 26

## 2025-06-30 PROCEDURE — 71260 CT THORAX DX C+: CPT

## 2025-06-30 PROCEDURE — 74177 CT ABD & PELVIS W/CONTRAST: CPT | Mod: 26

## 2025-06-30 PROCEDURE — 74177 CT ABD & PELVIS W/CONTRAST: CPT

## 2025-07-07 ENCOUNTER — RESULT REVIEW (OUTPATIENT)
Age: 80
End: 2025-07-07

## 2025-07-07 ENCOUNTER — APPOINTMENT (OUTPATIENT)
Dept: HEMATOLOGY ONCOLOGY | Facility: CLINIC | Age: 80
End: 2025-07-07
Payer: MEDICARE

## 2025-07-07 VITALS
RESPIRATION RATE: 17 BRPM | TEMPERATURE: 97.1 F | HEIGHT: 68 IN | HEART RATE: 69 BPM | SYSTOLIC BLOOD PRESSURE: 133 MMHG | OXYGEN SATURATION: 99 % | BODY MASS INDEX: 28.4 KG/M2 | WEIGHT: 187.39 LBS | DIASTOLIC BLOOD PRESSURE: 83 MMHG

## 2025-07-07 LAB
BASOPHILS # BLD AUTO: 0.02 K/UL — SIGNIFICANT CHANGE UP (ref 0–0.2)
BASOPHILS NFR BLD AUTO: 0.7 % — SIGNIFICANT CHANGE UP (ref 0–2)
EOSINOPHIL # BLD AUTO: 0.05 K/UL — SIGNIFICANT CHANGE UP (ref 0–0.5)
EOSINOPHIL NFR BLD AUTO: 1.6 % — SIGNIFICANT CHANGE UP (ref 0–6)
HCT VFR BLD CALC: 39.3 % — SIGNIFICANT CHANGE UP (ref 34.5–45)
HGB BLD-MCNC: 13 G/DL — SIGNIFICANT CHANGE UP (ref 11.5–15.5)
IMM GRANULOCYTES NFR BLD AUTO: 0.7 % — SIGNIFICANT CHANGE UP (ref 0–0.9)
LYMPHOCYTES # BLD AUTO: 1.14 K/UL — SIGNIFICANT CHANGE UP (ref 1–3.3)
LYMPHOCYTES # BLD AUTO: 37.4 % — SIGNIFICANT CHANGE UP (ref 13–44)
MCHC RBC-ENTMCNC: 33.1 G/DL — SIGNIFICANT CHANGE UP (ref 32–36)
MCHC RBC-ENTMCNC: 35.8 PG — HIGH (ref 27–34)
MCV RBC AUTO: 108.3 FL — HIGH (ref 80–100)
MONOCYTES # BLD AUTO: 0.49 K/UL — SIGNIFICANT CHANGE UP (ref 0–0.9)
MONOCYTES NFR BLD AUTO: 16.1 % — HIGH (ref 2–14)
NEUTROPHILS # BLD AUTO: 1.33 K/UL — LOW (ref 1.8–7.4)
NEUTROPHILS NFR BLD AUTO: 43.5 % — SIGNIFICANT CHANGE UP (ref 43–77)
NRBC BLD AUTO-RTO: 0 /100 WBCS — SIGNIFICANT CHANGE UP (ref 0–0)
PLATELET # BLD AUTO: 170 K/UL — SIGNIFICANT CHANGE UP (ref 150–400)
RBC # BLD: 3.63 M/UL — LOW (ref 3.8–5.2)
RBC # FLD: 15.5 % — HIGH (ref 10.3–14.5)
WBC # BLD: 3.05 K/UL — LOW (ref 3.8–10.5)
WBC # FLD AUTO: 3.05 K/UL — LOW (ref 3.8–10.5)

## 2025-07-07 PROCEDURE — G2211 COMPLEX E/M VISIT ADD ON: CPT

## 2025-07-07 PROCEDURE — 99214 OFFICE O/P EST MOD 30 MIN: CPT

## 2025-07-08 LAB
ALBUMIN SERPL ELPH-MCNC: 4.7 G/DL
ALP BLD-CCNC: 100 U/L
ALT SERPL-CCNC: 22 U/L
ANION GAP SERPL CALC-SCNC: 12 MMOL/L
AST SERPL-CCNC: 32 U/L
BILIRUB SERPL-MCNC: 0.5 MG/DL
BUN SERPL-MCNC: 17 MG/DL
CALCIUM SERPL-MCNC: 8.8 MG/DL
CANCER AG19-9 SERPL-ACNC: <2 U/ML
CHLORIDE SERPL-SCNC: 106 MMOL/L
CO2 SERPL-SCNC: 23 MMOL/L
CREAT SERPL-MCNC: 0.96 MG/DL
EGFRCR SERPLBLD CKD-EPI 2021: 60 ML/MIN/1.73M2
GLUCOSE SERPL-MCNC: 102 MG/DL
POTASSIUM SERPL-SCNC: 4.6 MMOL/L
PROT SERPL-MCNC: 7.2 G/DL
SODIUM SERPL-SCNC: 142 MMOL/L

## 2025-07-24 ENCOUNTER — APPOINTMENT (OUTPATIENT)
Dept: ORTHOPEDIC SURGERY | Facility: CLINIC | Age: 80
End: 2025-07-24
Payer: MEDICARE

## 2025-07-24 VITALS — BODY MASS INDEX: 27.28 KG/M2 | WEIGHT: 180 LBS | HEIGHT: 68 IN

## 2025-07-24 DIAGNOSIS — M17.11 UNILATERAL PRIMARY OSTEOARTHRITIS, RIGHT KNEE: ICD-10-CM

## 2025-07-24 DIAGNOSIS — S83.8X1A SPRAIN OF OTHER SPECIFIED PARTS OF RIGHT KNEE, INITIAL ENCOUNTER: ICD-10-CM

## 2025-07-24 DIAGNOSIS — Z90.49 ACQUIRED ABSENCE OF OTHER SPECIFIED PARTS OF DIGESTIVE TRACT: ICD-10-CM

## 2025-07-24 PROCEDURE — 99213 OFFICE O/P EST LOW 20 MIN: CPT | Mod: 25

## 2025-07-24 PROCEDURE — 20610 DRAIN/INJ JOINT/BURSA W/O US: CPT | Mod: RT

## 2025-09-18 ENCOUNTER — APPOINTMENT (OUTPATIENT)
Dept: CT IMAGING | Facility: IMAGING CENTER | Age: 80
End: 2025-09-18
Payer: MEDICARE

## 2025-09-18 PROCEDURE — 71260 CT THORAX DX C+: CPT | Mod: 26

## 2025-09-18 PROCEDURE — 74177 CT ABD & PELVIS W/CONTRAST: CPT | Mod: 26

## (undated) DEVICE — SUT PROLENE 5-0 36" RB-1

## (undated) DEVICE — Device

## (undated) DEVICE — POSITIONER PINK PAD PIGAZZI SYSTEM

## (undated) DEVICE — TUBING AIRSEAL TRI-LUMEN FILTERED

## (undated) DEVICE — POSITIONER FOAM HEAD CRADLE (PINK)

## (undated) DEVICE — LIJ/LIA-HOLDER SCOPE: Type: DURABLE MEDICAL EQUIPMENT

## (undated) DEVICE — FOLEY TRAY 16FR 5CC LTX UMETER CLOSED

## (undated) DEVICE — VESSEL LOOP MINI-BLUE 0.075" X 16"

## (undated) DEVICE — NDL HYPO SAFE 22G X 1.5" (BLACK)

## (undated) DEVICE — ENDOCATCH 10MM SPECIMEN POUCH

## (undated) DEVICE — COVIDIEN SIGNIA POWER CONTROL SHELL

## (undated) DEVICE — YELLOW PIN COVER

## (undated) DEVICE — FOLEY HOLDER STATLOCK 2 WAY ADULT

## (undated) DEVICE — SUT VICRYL 2-0 36" CT-1 UNDYED

## (undated) DEVICE — TUBING SUCTION CONN 6FT STERILE

## (undated) DEVICE — DRAPE IOBAN 23" X 23"

## (undated) DEVICE — XI ARM CLIP APPLIER MEDIUM-LARGE

## (undated) DEVICE — XI DRAPE ARM

## (undated) DEVICE — SUT PROLENE 4-0 36" RB-1

## (undated) DEVICE — SUT PROLENE 4-0 36" SH

## (undated) DEVICE — SUT VICRYL 0 27" UR-6

## (undated) DEVICE — ELCTR BOVIE PENCIL SMOKE EVACUATION

## (undated) DEVICE — LAP PAD 4 X 18"

## (undated) DEVICE — CLAMP BX HOT RAD JAW 3

## (undated) DEVICE — TUBING STRYKEFLOW II SUCTION / IRRIGATOR

## (undated) DEVICE — URETERAL CATH RED RUBBER 8FR

## (undated) DEVICE — XI ARM PERMANENT CAUTERY HOOK

## (undated) DEVICE — PACK IV START WITH CHG

## (undated) DEVICE — TUBING SUCTION 20FT

## (undated) DEVICE — GLV 8 PROTEXIS (WHITE)

## (undated) DEVICE — IRRIGATOR BIO SHIELD

## (undated) DEVICE — XI ARM FORCEP CADIERE 8MM

## (undated) DEVICE — XI TIP COVER

## (undated) DEVICE — TROCAR SURGIQUEST AIRSEAL 12MMX100MM

## (undated) DEVICE — SUCTION YANKAUER NO CONTROL VENT

## (undated) DEVICE — GLV 7.5 PROTEXIS (WHITE)

## (undated) DEVICE — WARMING BLANKET LOWER ADULT

## (undated) DEVICE — SPECIMEN CONTAINER 100ML

## (undated) DEVICE — DRAPE MAYO STAND 30"

## (undated) DEVICE — XI ARM SCISSOR MONO CURVED

## (undated) DEVICE — XI SEAL UNIVERSIAL 5-12MM

## (undated) DEVICE — ELCTR GROUNDING PAD ADULT COVIDIEN

## (undated) DEVICE — SENSOR O2 FINGER ADULT

## (undated) DEVICE — DRAPE INSTRUMENT POUCH 6.75" X 11"

## (undated) DEVICE — VENODYNE/SCD SLEEVE CALF MEDIUM

## (undated) DEVICE — SPONGE DISSECTOR PEANUT

## (undated) DEVICE — D HELP - CLEARVIEW CLEARIFY SYSTEM

## (undated) DEVICE — POSITIONER FOAM EGG CRATE ULNAR 2PCS (PINK)

## (undated) DEVICE — POSITIONER PURPLE ARM ONE STEP (LARGE)

## (undated) DEVICE — CATH IV SAFE BC 22G X 1" (BLUE)

## (undated) DEVICE — APPL LAPAROSCOPIC EXTENDED 35CM

## (undated) DEVICE — PROBE LAP ARGON BEAM 5MM

## (undated) DEVICE — TUBING CAP SET ENDO 24HR USE GI

## (undated) DEVICE — SOL INJ NS 0.9% 500ML 2 PORT

## (undated) DEVICE — XI ARM FORCEP FENESTRATED BIPOLAR 8MM

## (undated) DEVICE — VESSEL LOOP MAXI-YELLOW  0.120" X 16"

## (undated) DEVICE — SUT MONOCRYL 4-0 27" PS-2 UNDYED

## (undated) DEVICE — POLY TRAP ETRAP

## (undated) DEVICE — TUBING IV SET GRAVITY 3Y 100" MACRO

## (undated) DEVICE — PROBE FIAPC DIA 2.3MM/7FR LNTH 220CM/7.2FT

## (undated) DEVICE — PACK ROBOTIC LIJ

## (undated) DEVICE — BLADE SCALPEL SAFETYLOCK #10

## (undated) DEVICE — CATH IV SAFE BC 20G X 1.16" (PINK)